# Patient Record
Sex: MALE | Race: WHITE | NOT HISPANIC OR LATINO | Employment: FULL TIME | RURAL
[De-identification: names, ages, dates, MRNs, and addresses within clinical notes are randomized per-mention and may not be internally consistent; named-entity substitution may affect disease eponyms.]

---

## 2020-02-24 ENCOUNTER — OFFICE VISIT (OUTPATIENT)
Dept: FAMILY MEDICINE CLINIC | Facility: CLINIC | Age: 35
End: 2020-02-24

## 2020-02-24 VITALS
WEIGHT: 222.6 LBS | HEIGHT: 72 IN | OXYGEN SATURATION: 95 % | DIASTOLIC BLOOD PRESSURE: 80 MMHG | TEMPERATURE: 98 F | HEART RATE: 96 BPM | BODY MASS INDEX: 30.15 KG/M2 | RESPIRATION RATE: 20 BRPM | SYSTOLIC BLOOD PRESSURE: 120 MMHG

## 2020-02-24 DIAGNOSIS — Z76.89 ENCOUNTER TO ESTABLISH CARE: Primary | ICD-10-CM

## 2020-02-24 DIAGNOSIS — E08.21 DIABETES DUE TO UNDERLYING CONDITION W DIABETIC NEPHROPATHY (HCC): Primary | ICD-10-CM

## 2020-02-24 DIAGNOSIS — R31.9 HEMATURIA, UNSPECIFIED TYPE: ICD-10-CM

## 2020-02-24 DIAGNOSIS — R73.9 HYPERGLYCEMIA: ICD-10-CM

## 2020-02-24 DIAGNOSIS — R81 GLYCOSURIA: ICD-10-CM

## 2020-02-24 DIAGNOSIS — G25.81 RESTLESS LEG SYNDROME: ICD-10-CM

## 2020-02-24 LAB
BILIRUB BLD-MCNC: NEGATIVE MG/DL
CLARITY, POC: CLEAR
COLOR UR: ABNORMAL
GLUCOSE UR STRIP-MCNC: ABNORMAL MG/DL
KETONES UR QL: NEGATIVE
LEUKOCYTE EST, POC: NEGATIVE
NITRITE UR-MCNC: NEGATIVE MG/ML
PH UR: 5.5 [PH] (ref 5–8)
PROT UR STRIP-MCNC: NEGATIVE MG/DL
RBC # UR STRIP: ABNORMAL /UL
SP GR UR: 1.01 (ref 1–1.03)
UROBILINOGEN UR QL: NORMAL

## 2020-02-24 PROCEDURE — 81002 URINALYSIS NONAUTO W/O SCOPE: CPT | Performed by: NURSE PRACTITIONER

## 2020-02-24 PROCEDURE — 99203 OFFICE O/P NEW LOW 30 MIN: CPT | Performed by: NURSE PRACTITIONER

## 2020-02-24 PROCEDURE — 87086 URINE CULTURE/COLONY COUNT: CPT | Performed by: NURSE PRACTITIONER

## 2020-02-24 RX ORDER — ATORVASTATIN CALCIUM 10 MG/1
10 TABLET, FILM COATED ORAL DAILY
Qty: 30 TABLET | Refills: 0 | Status: SHIPPED | OUTPATIENT
Start: 2020-02-24 | End: 2020-03-27

## 2020-02-24 RX ORDER — ROPINIROLE 0.5 MG/1
0.5 TABLET, FILM COATED ORAL NIGHTLY
Qty: 14 TABLET | Refills: 0 | Status: SHIPPED | OUTPATIENT
Start: 2020-02-24 | End: 2020-03-09

## 2020-02-24 NOTE — PROGRESS NOTES
"Chief Complaint   Patient presents with   • Establish Care     New Pt       Subjective:  Cal Badillo is a 34 y.o. male who presents to [facility]. Pt reports hx of DM, RLS and diabetic neuropathy. Reports he has not been on any medications x 1-1/2 years. Reports has been to ER on several occasions for BS >1300. Reports BS stays around 400s, with last known A1c = 20. Reports dizziness, polyuria, polyphagia and weight loss of 40 pounds in 2 mos. Currently no past medical records to review.      The following portions of the patient's history were reviewed and updated as appropriate: allergies, current medications, past family history, past medical history, past social history, past surgical history and problem list.    Here to [facility]. Has no medical records to review. Reports last PCP was Queenie Real. Hx of DM, diabetic neuropathy, RLS and \"mild stroke d/t DM\". Reports no residual effects from CVA. Has glucometer to check BS at home and reports BS regularly 400s. Has been seen in ER for BS >1300. Associated sx reported: dizziness, polyuria, polyphagia, polydipsia and weight loss. Has not taken any medication in past 1-1/2 years.       Past Medical History:   Diagnosis Date   • CVA (cerebral vascular accident) (CMS/[facility])    • Diabetes mellitus (CMS/[facility])          Current Outpatient Medications:   •  atorvastatin (LIPITOR) 10 MG tablet, Take 1 tablet by mouth Daily., Disp: 30 tablet, Rfl: 0  •  metFORMIN (GLUCOPHAGE) 500 MG tablet, Take 1 tablet by mouth 2 (Two) Times a Day With Meals., Disp: 30 tablet, Rfl: 0  •  rOPINIRole (REQUIP) 0.5 MG tablet, Take 1 tablet by mouth Every Night for 14 days. Take 1 hour before bedtime. Take 1/2 tab x 3 days then increase to 1 tab., Disp: 14 tablet, Rfl: 0    Review of Systems    Review of Systems   Constitutional: Positive for unexpected weight change.   Respiratory: Negative for chest tightness.    Cardiovascular: Negative for chest pain and leg swelling. " "  Gastrointestinal: Positive for abdominal pain. Negative for constipation, diarrhea and vomiting.   Endocrine: Positive for polydipsia, polyphagia and polyuria.   Genitourinary: Negative for dysuria.   Musculoskeletal: Positive for arthralgias.   Neurological: Positive for dizziness. Negative for syncope.       Objective  Vitals:    02/24/20 0926   BP: 120/80   BP Location: Left arm   Patient Position: Sitting   Cuff Size: Adult   Pulse: 96   Resp: 20   Temp: 98 °F (36.7 °C)   TempSrc: Oral   SpO2: 95%   Weight: 101 kg (222 lb 9.6 oz)   Height: 182.9 cm (72\")   PainSc: 0-No pain       Physical Exam   Constitutional: He is oriented to person, place, and time. He appears well-developed and well-nourished.   HENT:   Head: Normocephalic and atraumatic.   Eyes: Pupils are equal, round, and reactive to light. Conjunctivae are normal.   Neck: Normal range of motion. Neck supple.   Cardiovascular: Normal rate, regular rhythm and normal heart sounds.   Pulmonary/Chest: Effort normal and breath sounds normal.   Musculoskeletal: He exhibits tenderness (bilat shoulders).   Lymphadenopathy:     He has no cervical adenopathy.   Neurological: He is alert and oriented to person, place, and time.   Skin: Skin is warm and dry.   Psychiatric: He has a normal mood and affect. His behavior is normal.   Nursing note and vitals reviewed.        Cal was seen today for establish care.    Diagnoses and all orders for this visit:    Encounter to establish care    Hyperglycemia  -     Comprehensive Metabolic Panel; Future  -     Hemoglobin A1c; Future  -     metFORMIN (GLUCOPHAGE) 500 MG tablet; Take 1 tablet by mouth 2 (Two) Times a Day With Meals.  -     atorvastatin (LIPITOR) 10 MG tablet; Take 1 tablet by mouth Daily.  -     Ambulatory Referral to Endocrinology  -     MicroAlbumin, Urine, Random - Urine, Clean Catch; Future    Glycosuria  -     Comprehensive Metabolic Panel; Future  -     Hemoglobin A1c; Future  -     Ambulatory " Referral to Endocrinology  -     MicroAlbumin, Urine, Random - Urine, Clean Catch; Future    Hematuria, unspecified type  -     Urine Culture - Urine, Urine, Clean Catch    Restless leg syndrome  -     rOPINIRole (REQUIP) 0.5 MG tablet; Take 1 tablet by mouth Every Night for 14 days. Take 1 hour before bedtime. Take 1/2 tab x 3 days then increase to 1 tab.    1. Est care visit with no past medical records to review. All PMH based on subjective information. Pt to sign medical release for PCP and ER records.  2.  with 3+ glucose in urine today. Pt reports BS normally 400s.  3. Will send urine for cx d/t blood present and glucose.   4. CMP and A1c ordered today.  5. Will restart metformin, atorvastatin and ropinirole today.  6. Advised monitoring BS daily and to ER for persistent BS >400-500. Pt v/u.  7. RTC 1 week or PRN  8. Will intiate endocrinology referral.      This document has been electronically signed by KAYLAN Kendall on February 24, 2020 5:33 PM

## 2020-02-25 DIAGNOSIS — E11.9 TYPE 2 DIABETES MELLITUS WITHOUT COMPLICATION, UNSPECIFIED WHETHER LONG TERM INSULIN USE (HCC): Primary | ICD-10-CM

## 2020-02-25 RX ORDER — INSULIN GLARGINE 100 [IU]/ML
20 INJECTION, SOLUTION SUBCUTANEOUS NIGHTLY
Qty: 3 ML | Refills: 4 | Status: SHIPPED | OUTPATIENT
Start: 2020-02-25 | End: 2020-05-04 | Stop reason: SDUPTHER

## 2020-02-26 ENCOUNTER — LAB (OUTPATIENT)
Dept: LAB | Facility: HOSPITAL | Age: 35
End: 2020-02-26

## 2020-02-26 ENCOUNTER — TELEPHONE (OUTPATIENT)
Dept: FAMILY MEDICINE CLINIC | Facility: CLINIC | Age: 35
End: 2020-02-26

## 2020-02-26 DIAGNOSIS — R73.9 HYPERGLYCEMIA: ICD-10-CM

## 2020-02-26 DIAGNOSIS — E11.9 TYPE 2 DIABETES MELLITUS WITHOUT COMPLICATION, UNSPECIFIED WHETHER LONG TERM INSULIN USE (HCC): Primary | ICD-10-CM

## 2020-02-26 DIAGNOSIS — R81 GLYCOSURIA: ICD-10-CM

## 2020-02-26 LAB
ALBUMIN SERPL-MCNC: 4.1 G/DL (ref 3.5–5.2)
ALBUMIN UR-MCNC: <1.2 MG/DL
ALBUMIN/GLOB SERPL: 1.4 G/DL
ALP SERPL-CCNC: 88 U/L (ref 39–117)
ALT SERPL W P-5'-P-CCNC: 23 U/L (ref 1–41)
ANION GAP SERPL CALCULATED.3IONS-SCNC: 14.4 MMOL/L (ref 5–15)
AST SERPL-CCNC: 15 U/L (ref 1–40)
BACTERIA SPEC AEROBE CULT: NO GROWTH
BILIRUB SERPL-MCNC: 0.3 MG/DL (ref 0.2–1.2)
BUN BLD-MCNC: 9 MG/DL (ref 6–20)
BUN/CREAT SERPL: 9.8 (ref 7–25)
CALCIUM SPEC-SCNC: 9.5 MG/DL (ref 8.6–10.5)
CHLORIDE SERPL-SCNC: 90 MMOL/L (ref 98–107)
CO2 SERPL-SCNC: 25.6 MMOL/L (ref 22–29)
CREAT BLD-MCNC: 0.92 MG/DL (ref 0.76–1.27)
GFR SERPL CREATININE-BSD FRML MDRD: 93 ML/MIN/1.73
GLOBULIN UR ELPH-MCNC: 2.9 GM/DL
GLUCOSE BLD-MCNC: 512 MG/DL (ref 65–99)
HBA1C MFR BLD: 12.8 % (ref 4.8–5.6)
POTASSIUM BLD-SCNC: 4.2 MMOL/L (ref 3.5–5.2)
PROT SERPL-MCNC: 7 G/DL (ref 6–8.5)
SODIUM BLD-SCNC: 130 MMOL/L (ref 136–145)

## 2020-02-26 PROCEDURE — 82043 UR ALBUMIN QUANTITATIVE: CPT

## 2020-02-26 PROCEDURE — 80053 COMPREHEN METABOLIC PANEL: CPT

## 2020-02-26 PROCEDURE — 83036 HEMOGLOBIN GLYCOSYLATED A1C: CPT

## 2020-02-26 RX ORDER — PEN NEEDLE, DIABETIC 30 GX3/16"
1 NEEDLE, DISPOSABLE MISCELLANEOUS DAILY
Qty: 30 EACH | Refills: 11 | Status: SHIPPED | OUTPATIENT
Start: 2020-02-26 | End: 2021-02-09

## 2020-02-26 NOTE — TELEPHONE ENCOUNTER
Called Karen to verify scripts were received.  They were received, not filled because not due till the 2nd of march

## 2020-02-27 ENCOUNTER — TELEPHONE (OUTPATIENT)
Dept: FAMILY MEDICINE CLINIC | Facility: CLINIC | Age: 35
End: 2020-02-27

## 2020-02-27 DIAGNOSIS — Z79.4 TYPE 2 DIABETES MELLITUS WITH OTHER SPECIFIED COMPLICATION, WITH LONG-TERM CURRENT USE OF INSULIN (HCC): Primary | ICD-10-CM

## 2020-02-27 DIAGNOSIS — E11.69 TYPE 2 DIABETES MELLITUS WITH OTHER SPECIFIED COMPLICATION, WITH LONG-TERM CURRENT USE OF INSULIN (HCC): Primary | ICD-10-CM

## 2020-02-27 RX ORDER — BLOOD-GLUCOSE METER
1 KIT MISCELLANEOUS TAKE AS DIRECTED
Qty: 1 EACH | Refills: 0 | Status: SHIPPED | OUTPATIENT
Start: 2020-02-27 | End: 2021-02-09

## 2020-02-27 NOTE — TELEPHONE ENCOUNTER
Lab at [facility] alerted me about pt's labwork glucose >500.      Spoke with KAYLAN Goodwin and pt started on insulin and  was advised to  Start insulin as soon as possible.  Also verified with pharmacy about insulin prescription.          This document has been electronically signed by Tim Ray MD on February 26, 2020 7:58 PM

## 2020-02-28 ENCOUNTER — TELEPHONE (OUTPATIENT)
Dept: FAMILY MEDICINE CLINIC | Facility: CLINIC | Age: 35
End: 2020-02-28

## 2020-02-28 NOTE — TELEPHONE ENCOUNTER
"I called [facility] to check why this was happening, Jolanta stated that \"the insurance told them that he was locked to specific provider\" I called previous PCP office asked about this and she stated yes he would have to get his PCP on his insurance changed, I called pt to inform of this information and he stated he has taken care of it and was at [facility] now. I also had previous PCP office send over pt medical records. "

## 2020-02-28 NOTE — TELEPHONE ENCOUNTER
PT CALLED AND STATED THAT ALL OF HIS DIABETES SUPPLIES ARE BEING DENIED BY HIS INSURANCE. PT STATED HIS BLOOD SUGAR IS . PLEASE ADVISE.

## 2020-03-02 ENCOUNTER — TELEPHONE (OUTPATIENT)
Dept: FAMILY MEDICINE CLINIC | Facility: CLINIC | Age: 35
End: 2020-03-02

## 2020-03-02 ENCOUNTER — OFFICE VISIT (OUTPATIENT)
Dept: FAMILY MEDICINE CLINIC | Facility: CLINIC | Age: 35
End: 2020-03-02

## 2020-03-02 VITALS
HEART RATE: 73 BPM | OXYGEN SATURATION: 97 % | BODY MASS INDEX: 29.47 KG/M2 | WEIGHT: 217.6 LBS | DIASTOLIC BLOOD PRESSURE: 80 MMHG | RESPIRATION RATE: 20 BRPM | HEIGHT: 72 IN | SYSTOLIC BLOOD PRESSURE: 122 MMHG | TEMPERATURE: 98.1 F

## 2020-03-02 DIAGNOSIS — Z79.4 TYPE 2 DIABETES MELLITUS WITHOUT COMPLICATION, WITH LONG-TERM CURRENT USE OF INSULIN (HCC): Primary | ICD-10-CM

## 2020-03-02 DIAGNOSIS — E11.9 TYPE 2 DIABETES MELLITUS WITHOUT COMPLICATION, WITH LONG-TERM CURRENT USE OF INSULIN (HCC): Primary | ICD-10-CM

## 2020-03-02 PROCEDURE — 99213 OFFICE O/P EST LOW 20 MIN: CPT | Performed by: NURSE PRACTITIONER

## 2020-03-02 RX ORDER — LANCETS
EACH MISCELLANEOUS
Qty: 200 EACH | Refills: 5 | Status: SHIPPED | OUTPATIENT
Start: 2020-03-02 | End: 2020-05-04 | Stop reason: SDUPTHER

## 2020-03-02 RX ORDER — BLOOD PRESSURE TEST KIT
KIT MISCELLANEOUS
Qty: 120 EACH | Refills: 3 | Status: SHIPPED | OUTPATIENT
Start: 2020-03-02 | End: 2020-05-04 | Stop reason: SDUPTHER

## 2020-03-02 RX ORDER — BLOOD-GLUCOSE METER
EACH MISCELLANEOUS
COMMUNITY
Start: 2020-02-28 | End: 2020-12-30

## 2020-03-02 NOTE — TELEPHONE ENCOUNTER
Pt called at 10:21 am stated that his blood sugar per finger stick is 525. Pt wanted to know how many units of his fast acting insulin Novolin R insulin he could take .I informed Pt that his appointment is at 10:45 and to come to the office now so that the current reading can be addressed.

## 2020-03-05 PROBLEM — N20.0 CALCULUS OF KIDNEY: Status: ACTIVE | Noted: 2020-03-05

## 2020-03-05 PROBLEM — Z79.4 TYPE 2 DIABETES MELLITUS WITHOUT COMPLICATION, WITH LONG-TERM CURRENT USE OF INSULIN (HCC): Status: ACTIVE | Noted: 2020-03-05

## 2020-03-05 PROBLEM — E78.2 MIXED HYPERLIPIDEMIA: Status: ACTIVE | Noted: 2020-03-05

## 2020-03-05 PROBLEM — I10 ESSENTIAL HYPERTENSION: Status: ACTIVE | Noted: 2020-03-05

## 2020-03-05 PROBLEM — E11.9 TYPE 2 DIABETES MELLITUS WITHOUT COMPLICATION, WITH LONG-TERM CURRENT USE OF INSULIN (HCC): Status: ACTIVE | Noted: 2020-03-05

## 2020-03-05 PROBLEM — J44.9 COPD (CHRONIC OBSTRUCTIVE PULMONARY DISEASE) (HCC): Status: ACTIVE | Noted: 2020-03-05

## 2020-03-05 NOTE — PROGRESS NOTES
"Chief Complaint   Patient presents with   • Follow-up     1 weeks   • Diabetes       Subjective:  Cal Badillo is a 34 y.o. male who presents for f/u on high blood sugars. Reports going to [facility] ER as directed. Upon arrival to ER pt reports BS was 525. Was given insulin in ER and was DC with BS of 440 and told to f/u with PCP. Pt reports now has all diabetic supplies including insulin, glucometer, strips that were originally ordered but d/t ins \"lock in program\" was unable to get insulin and supplies filled. Pt requesting RX for alcohol swabs, insulin needles and lancets.       The following portions of the patient's history were reviewed and updated as appropriate: allergies, current medications, past family history, past medical history, past social history, past surgical history and problem list.    Diabetes   He presents for his follow-up diabetic visit. He has type 2 diabetes mellitus. His disease course has been worsening. Pertinent negatives for hypoglycemia include no dizziness or headaches. Associated symptoms include fatigue. Pertinent negatives for diabetes include no chest pain, no polydipsia, no polyphagia, no polyuria, no visual change, no weakness and no weight loss. Pertinent negatives for hypoglycemia complications include no blackouts, no hospitalization, no nocturnal hypoglycemia, no required assistance and no required glucagon injection. Symptoms are worsening. Risk factors for coronary artery disease include male sex and diabetes mellitus. Current diabetic treatment includes oral agent (monotherapy) and insulin injections. He is compliant with treatment some of the time. His weight is stable. When asked about meal planning, he reported none. He has not had a previous visit with a dietitian (referral submitted). He rarely participates in exercise. There is no change in his home blood glucose trend. An ACE inhibitor/angiotensin II receptor blocker is being taken. He does not see a podiatrist.Eye " exam is not current.        Past Medical History:   Diagnosis Date   • CVA (cerebral vascular accident) (CMS/[facility])    • Diabetes mellitus (CMS/[facility])          Current Outpatient Medications:   •  atorvastatin (LIPITOR) 10 MG tablet, Take 1 tablet by mouth Daily., Disp: 30 tablet, Rfl: 0  •  Blood Glucose Monitoring Suppl (ONE TOUCH ULTRA 2) w/Device kit, , Disp: , Rfl:   •  glucose blood (ONE TOUCH ULTRA TEST) test strip, Check blood sugar 2 times daily dx e11.9, Disp: 100 each, Rfl: 12  •  glucose monitor monitoring kit, 1 each Take As Directed. Check BS 1 hr before meals, 2 hrs after meals and at bedtime, Disp: 1 each, Rfl: 0  •  Insulin Glargine (BASAGLAR KWIKPEN) 100 UNIT/ML injection pen, Inject 20 Units under the skin into the appropriate area as directed Every Night., Disp: 3 mL, Rfl: 4  •  Insulin Pen Needle (PEN NEEDLES) 31G X 8 MM misc, 1 each Daily., Disp: 30 each, Rfl: 11  •  rOPINIRole (REQUIP) 0.5 MG tablet, Take 1 tablet by mouth Every Night for 14 days. Take 1 hour before bedtime. Take 1/2 tab x 3 days then increase to 1 tab., Disp: 14 tablet, Rfl: 0  •  Alcohol Swabs pads, Use swab as needed to clean insulin vial and to clean skin before injection or finger stick., Disp: 120 each, Rfl: 3  •  insulin regular (HUMULIN R) 100 UNIT/ML injection, Inject 10 Units under the skin into the appropriate area as directed 3 (Three) Times a Day Before Meals., Disp: 10 mL, Rfl: 11  •  Insulin Syringes, Disposable, U-100 0.3 ML misc, 1 syringe As Needed (use with each Humulin R dose administration)., Disp: 100 each, Rfl: 5  •  Lancets (ONETOUCH ULTRASOFT) lancets, Use to obtain blood sample to test blood sugar at least 3 times daily, Disp: 200 each, Rfl: 5  •  metFORMIN (GLUCOPHAGE) 1000 MG tablet, Take 1 tablet by mouth 2 (Two) Times a Day With Meals., Disp: 30 tablet, Rfl: 5    Review of Systems    Review of Systems   Constitutional: Positive for fatigue. Negative for weight loss.   Cardiovascular: Negative for  "chest pain, palpitations and leg swelling.   Endocrine: Negative for polydipsia, polyphagia and polyuria.   Neurological: Negative for dizziness, weakness, light-headedness, numbness and headaches.   Psychiatric/Behavioral: Negative for sleep disturbance.       Objective  Vitals:    03/02/20 1110   BP: 122/80   BP Location: Right arm   Patient Position: Sitting   Cuff Size: Adult   Pulse: 73   Resp: 20   Temp: 98.1 °F (36.7 °C)   TempSrc: Oral   SpO2: 97%   Weight: 98.7 kg (217 lb 9.6 oz)   Height: 182.9 cm (72\")   PainSc: 0-No pain       Physical Exam   Constitutional: He is oriented to person, place, and time. He appears well-developed and well-nourished.   HENT:   Head: Normocephalic and atraumatic.   Eyes: Pupils are equal, round, and reactive to light. Conjunctivae are normal.   Neck: Normal range of motion. Neck supple.   Cardiovascular: Normal rate, regular rhythm and normal heart sounds.   Pulmonary/Chest: Effort normal and breath sounds normal.   Musculoskeletal: Normal range of motion.   Neurological: He is alert and oriented to person, place, and time.   Psychiatric: He has a normal mood and affect. His behavior is normal.   Nursing note and vitals reviewed.        Cal was seen today for follow-up and diabetes.    Diagnoses and all orders for this visit:    Type 2 diabetes mellitus with other specified complication, with long-term current use of insulin (CMS/[facility])  -     Ambulatory Referral to Nutrition Services  -     Alcohol Swabs pads; Use swab as needed to clean insulin vial and to clean skin before injection or finger stick.  -     Insulin Syringes, Disposable, U-100 0.3 ML misc; 1 syringe As Needed (use with each Humulin R dose administration).  -     insulin regular (HUMULIN R) 100 UNIT/ML injection; Inject 10 Units under the skin into the appropriate area as directed 3 (Three) Times a Day Before Meals.  -     metFORMIN (GLUCOPHAGE) 1000 MG tablet; Take 1 tablet by mouth 2 (Two) Times a Day With " Meals.  -     Lancets (ONETOUCH ULTRASOFT) lancets; Use to obtain blood sample to test blood sugar at least 3 times daily    1. Metformin increased to 1000 mg BID - Discussed possible GI SE. Pt v/u.  2. Discussed checking BS one hour before meals and 2 hrs after meals and at HS and bring log to next appt.  3. Discussed importance of compliance with insulin, metformin and diet. Pt v/u.  4. Referral to diabetic educator/nutritionist submitted today.  5. RTC 2 weeks for f/u or PRN pending BS control will consider adding SGLT2 or GLP-1.       This document has been electronically signed by KAYLAN Kendall on March 5, 2020 11:13 AM

## 2020-03-27 DIAGNOSIS — R73.9 HYPERGLYCEMIA: ICD-10-CM

## 2020-03-27 RX ORDER — ATORVASTATIN CALCIUM 10 MG/1
TABLET, FILM COATED ORAL
Qty: 30 TABLET | Refills: 0 | Status: SHIPPED | OUTPATIENT
Start: 2020-03-27 | End: 2020-05-04 | Stop reason: SDUPTHER

## 2020-03-30 RX ORDER — SYRING-NEEDL,DISP,INSUL,0.3 ML 31 GX5/16"
SYRINGE, EMPTY DISPOSABLE MISCELLANEOUS
COMMUNITY
Start: 2020-03-03 | End: 2021-02-09

## 2020-05-04 ENCOUNTER — OFFICE VISIT (OUTPATIENT)
Dept: FAMILY MEDICINE CLINIC | Facility: CLINIC | Age: 35
End: 2020-05-04

## 2020-05-04 ENCOUNTER — TELEPHONE (OUTPATIENT)
Dept: FAMILY MEDICINE CLINIC | Facility: CLINIC | Age: 35
End: 2020-05-04

## 2020-05-04 VITALS
BODY MASS INDEX: 30.07 KG/M2 | DIASTOLIC BLOOD PRESSURE: 91 MMHG | OXYGEN SATURATION: 96 % | WEIGHT: 222 LBS | HEART RATE: 93 BPM | RESPIRATION RATE: 18 BRPM | TEMPERATURE: 97.5 F | SYSTOLIC BLOOD PRESSURE: 134 MMHG | HEIGHT: 72 IN

## 2020-05-04 DIAGNOSIS — M25.521 RIGHT ELBOW PAIN: ICD-10-CM

## 2020-05-04 DIAGNOSIS — G47.00 INSOMNIA, UNSPECIFIED TYPE: ICD-10-CM

## 2020-05-04 DIAGNOSIS — G25.81 RESTLESS LEG SYNDROME: ICD-10-CM

## 2020-05-04 DIAGNOSIS — E11.9 TYPE 2 DIABETES MELLITUS WITHOUT COMPLICATION, WITH LONG-TERM CURRENT USE OF INSULIN (HCC): Primary | ICD-10-CM

## 2020-05-04 DIAGNOSIS — G89.29 CHRONIC LEFT SHOULDER PAIN: ICD-10-CM

## 2020-05-04 DIAGNOSIS — E11.40 DIABETIC NEUROPATHY, PAINFUL (HCC): ICD-10-CM

## 2020-05-04 DIAGNOSIS — M25.512 CHRONIC LEFT SHOULDER PAIN: ICD-10-CM

## 2020-05-04 DIAGNOSIS — Z79.4 TYPE 2 DIABETES MELLITUS WITHOUT COMPLICATION, WITH LONG-TERM CURRENT USE OF INSULIN (HCC): Primary | ICD-10-CM

## 2020-05-04 PROBLEM — J44.9 COPD (CHRONIC OBSTRUCTIVE PULMONARY DISEASE) (HCC): Status: RESOLVED | Noted: 2020-03-05 | Resolved: 2020-05-04

## 2020-05-04 PROBLEM — G47.39 OTHER SLEEP APNEA: Status: ACTIVE | Noted: 2020-05-04

## 2020-05-04 PROCEDURE — 99214 OFFICE O/P EST MOD 30 MIN: CPT | Performed by: NURSE PRACTITIONER

## 2020-05-04 RX ORDER — INSULIN GLARGINE 100 [IU]/ML
20 INJECTION, SOLUTION SUBCUTANEOUS NIGHTLY
Qty: 3 ML | Refills: 5 | Status: SHIPPED | OUTPATIENT
Start: 2020-05-04 | End: 2021-02-09

## 2020-05-04 RX ORDER — ATORVASTATIN CALCIUM 10 MG/1
10 TABLET, FILM COATED ORAL DAILY
Qty: 30 TABLET | Refills: 5 | Status: SHIPPED | OUTPATIENT
Start: 2020-05-04 | End: 2021-02-09

## 2020-05-04 RX ORDER — LANCETS
EACH MISCELLANEOUS
Qty: 200 EACH | Refills: 5 | Status: SHIPPED | OUTPATIENT
Start: 2020-05-04 | End: 2021-02-09

## 2020-05-04 RX ORDER — BLOOD PRESSURE TEST KIT
KIT MISCELLANEOUS
Qty: 120 EACH | Refills: 5 | Status: SHIPPED | OUTPATIENT
Start: 2020-05-04

## 2020-05-04 RX ORDER — SYRING-NEEDL,DISP,INSUL,0.3 ML 31 GX5/16"
SYRINGE, EMPTY DISPOSABLE MISCELLANEOUS
Status: CANCELLED | OUTPATIENT
Start: 2020-05-04

## 2020-05-04 RX ORDER — GABAPENTIN 300 MG/1
300 CAPSULE ORAL 3 TIMES DAILY
Qty: 90 CAPSULE | Refills: 0 | Status: SHIPPED | OUTPATIENT
Start: 2020-05-04 | End: 2020-05-18

## 2020-05-04 RX ORDER — TRAZODONE HYDROCHLORIDE 50 MG/1
50 TABLET ORAL NIGHTLY
Qty: 30 TABLET | Refills: 0 | Status: SHIPPED | OUTPATIENT
Start: 2020-05-04 | End: 2020-06-01

## 2020-05-04 RX ORDER — ROPINIROLE 0.5 MG/1
1 TABLET, FILM COATED ORAL NIGHTLY
Qty: 30 TABLET | Refills: 5 | Status: SHIPPED | OUTPATIENT
Start: 2020-05-04 | End: 2020-08-10

## 2020-05-04 RX ORDER — LISINOPRIL 10 MG/1
10 TABLET ORAL DAILY
Qty: 30 TABLET | Refills: 5 | Status: SHIPPED | OUTPATIENT
Start: 2020-05-04 | End: 2020-12-23 | Stop reason: SDUPTHER

## 2020-05-04 RX ORDER — GABAPENTIN 800 MG/1
800 TABLET ORAL 3 TIMES DAILY
Status: CANCELLED | OUTPATIENT
Start: 2020-05-04

## 2020-05-04 NOTE — PROGRESS NOTES
Chief Complaint   Patient presents with   • Diabetes     2 week recheck       Subjective:  Cal Badillo is a 34 y.o. male who presents for f/u for diabetes. Pt reports BS are still running 400-500s and has been to ER 3 times since his last appt. Reports he has not been to endocrinologist yet due to rescheduling because of COVID. Reports taking all medications a prescribed and limiting carb intake. Also c/o left shoulder pain that has been present for > 1 year and right elbow pain that has been present approx 6 mos. Denies known injury. Reports having xray of left shoulder over one year ago at his previous PCP and reports results were normal. Does report radiating pain with shoulder and elbow. Pain level 9/10. Requesting an increase in requip for RLS, states current dosing helps minimally, reports taking 1mg at HS in past. Also requesting refill of gabapentin for his neuropathy. Reports was seeing pain management for his gabapentin from but no longer goes.      The following portions of the patient's history were reviewed and updated as appropriate: allergies, current medications, past family history, past medical history, past social history, past surgical history and problem list.    Diabetes   He presents for his follow-up diabetic visit. He has type 1 diabetes mellitus. His disease course has been worsening. Pertinent negatives for hypoglycemia include no confusion, dizziness, sleepiness, sweats or tremors. Associated symptoms include foot paresthesias. Pertinent negatives for diabetes include no blurred vision, no chest pain, no fatigue and no foot ulcerations. Hypoglycemia complications include nocturnal hypoglycemia. Pertinent negatives for hypoglycemia complications include no blackouts, no hospitalization, no required assistance and no required glucagon injection. Symptoms are worsening. Diabetic complications include peripheral neuropathy. Risk factors for coronary artery disease include diabetes mellitus,  obesity, male sex, hypertension and stress. Current diabetic treatment includes oral agent (monotherapy), insulin injections and diet. He is compliant with treatment some of the time. His weight is stable. There is no change in his home blood glucose trend. His overall blood glucose range is >200 mg/dl. An ACE inhibitor/angiotensin II receptor blocker is being taken. He does not see a podiatrist.Eye exam is not current.   Arm Pain    There was no injury mechanism. The pain is present in the right elbow and left shoulder. The quality of the pain is described as aching. Radiates to: left shoulder radiates to left elbow; right elbow radiates down to right hand. The pain is at a severity of 9/10. The pain is severe. The pain has been intermittent since the incident. Pertinent negatives include no chest pain, muscle weakness, numbness or tingling. The symptoms are aggravated by movement, lifting and palpation. He has tried rest for the symptoms. The treatment provided no relief.   Leg Pain    There was no injury mechanism (r/t neuropathy). The pain is present in the left foot and right foot. The pain is at a severity of 9/10. The pain has been constant since onset. Pertinent negatives include no inability to bear weight, loss of motion, loss of sensation, muscle weakness, numbness or tingling. The symptoms are aggravated by weight bearing. He has tried rest for the symptoms. The treatment provided no relief.   Insomnia   This is a chronic problem. The current episode started more than 1 year ago. The problem occurs daily. The problem has been unchanged. Associated symptoms include arthralgias (left shoulder, right elbow, bilat lower extremities). Pertinent negatives include no chest pain, fatigue or numbness.        Past Medical History:   Diagnosis Date   • Diabetes mellitus (CMS/HCC)    • Diabetic polyneuropathy (CMS/HCC)    • Lumbar radiculopathy, chronic 2017         Current Outpatient Medications:   •  Alcohol Swabs  "pads, Use swab as needed to clean insulin vial and to clean skin before injection or finger stick., Disp: 120 each, Rfl: 5  •  atorvastatin (LIPITOR) 10 MG tablet, Take 1 tablet by mouth Daily., Disp: 30 tablet, Rfl: 5  •  Blood Glucose Monitoring Suppl (ONE TOUCH ULTRA 2) w/Device kit, , Disp: , Rfl:   •  gabapentin (NEURONTIN) 300 MG capsule, Take 1 capsule by mouth 3 (Three) Times a Day., Disp: 90 capsule, Rfl: 0  •  glucose blood (ONE TOUCH ULTRA TEST) test strip, Check blood sugar 2 times daily dx e11.9, Disp: 100 each, Rfl: 12  •  glucose monitor monitoring kit, 1 each Take As Directed. Check BS 1 hr before meals, 2 hrs after meals and at bedtime, Disp: 1 each, Rfl: 0  •  Insulin Glargine (BASAGLAR KWIKPEN) 100 UNIT/ML injection pen, Inject 20 Units under the skin into the appropriate area as directed Every Night., Disp: 3 mL, Rfl: 5  •  Insulin Pen Needle (PEN NEEDLES) 31G X 8 MM misc, 1 each Daily., Disp: 30 each, Rfl: 11  •  insulin regular (HumuLIN R) 100 UNIT/ML injection, Inject 10 Units under the skin into the appropriate area as directed 3 (Three) Times a Day Before Meals., Disp: 10 mL, Rfl: 5  •  Insulin Syringes, Disposable, U-100 0.3 ML misc, 1 syringe As Needed (use with each Humulin R dose administration)., Disp: 100 each, Rfl: 5  •  Lancets (ONETOUCH ULTRASOFT) lancets, Use to obtain blood sample to test blood sugar at least 3 times daily, Disp: 200 each, Rfl: 5  •  lisinopril (PRINIVIL,ZESTRIL) 10 MG tablet, Take 1 tablet by mouth Daily., Disp: 30 tablet, Rfl: 5  •  metFORMIN (Glucophage) 1000 MG tablet, Take 1 tablet by mouth 2 (Two) Times a Day With Meals., Disp: 30 tablet, Rfl: 5  •  rOPINIRole (Requip) 0.5 MG tablet, Take 2 tablets by mouth Every Night. Take 1 hour before bedtime., Disp: 30 tablet, Rfl: 5  •  traZODone (DESYREL) 50 MG tablet, Take 1 tablet by mouth Every Night., Disp: 30 tablet, Rfl: 0  •  TRUEPLUS INSULIN SYRINGE 31G X 5/16\" 0.3 ML misc, , Disp: , Rfl:     Review of " "Systems    Review of Systems   Constitutional: Negative for fatigue and unexpected weight change.   Eyes: Negative for blurred vision.   Respiratory: Negative for chest tightness and shortness of breath.    Cardiovascular: Negative for chest pain.   Musculoskeletal: Positive for arthralgias (left shoulder, right elbow, bilat lower extremities) and back pain.   Neurological: Negative for dizziness, tingling, tremors and numbness.   Psychiatric/Behavioral: Positive for sleep disturbance. Negative for confusion. The patient has insomnia.        Objective  Vitals:    05/04/20 1349   BP: 134/91   BP Location: Right arm   Patient Position: Sitting   Cuff Size: Adult   Pulse: 93   Resp: 18   Temp: 97.5 °F (36.4 °C)   TempSrc: Tympanic   SpO2: 96%   Weight: 101 kg (222 lb)   Height: 182.9 cm (72\")   PainSc:   9   PainLoc: Generalized       Physical Exam   Constitutional: He is oriented to person, place, and time. He appears well-developed and well-nourished. No distress.   HENT:   Head: Normocephalic and atraumatic.   Eyes: Pupils are equal, round, and reactive to light. Conjunctivae are normal.   Neck: Normal range of motion. Neck supple.   Cardiovascular: Normal rate, regular rhythm and normal heart sounds.   Pulmonary/Chest: Effort normal and breath sounds normal. No respiratory distress.   Musculoskeletal: He exhibits tenderness (left shoulder, right elbow).        Left shoulder: He exhibits decreased range of motion, tenderness and pain.        Right elbow: Tenderness found. Lateral epicondyle tenderness noted.        Arms:  Neurological: He is alert and oriented to person, place, and time.   Skin: Skin is warm and dry.   Psychiatric: He has a normal mood and affect. His behavior is normal.   Nursing note and vitals reviewed.        Cal was seen today for diabetes.    Diagnoses and all orders for this visit:    Type 2 diabetes mellitus without complication, with long-term current use of insulin (CMS/[facility])  -     " atorvastatin (LIPITOR) 10 MG tablet; Take 1 tablet by mouth Daily.  -     Lancets (ONETOUCH ULTRASOFT) lancets; Use to obtain blood sample to test blood sugar at least 3 times daily  -     metFORMIN (Glucophage) 1000 MG tablet; Take 1 tablet by mouth 2 (Two) Times a Day With Meals.  -     insulin regular (HumuLIN R) 100 UNIT/ML injection; Inject 10 Units under the skin into the appropriate area as directed 3 (Three) Times a Day Before Meals.  -     Alcohol Swabs pads; Use swab as needed to clean insulin vial and to clean skin before injection or finger stick.  -     Insulin Glargine (BASAGLAR KWIKPEN) 100 UNIT/ML injection pen; Inject 20 Units under the skin into the appropriate area as directed Every Night.  -     lisinopril (PRINIVIL,ZESTRIL) 10 MG tablet; Take 1 tablet by mouth Daily.  -     Insulin Syringes, Disposable, U-100 0.3 ML misc; 1 syringe As Needed (use with each Humulin R dose administration).  -     Ambulatory Referral to Podiatry    Chronic left shoulder pain  -     XR Shoulder 2+ View Left (In Office)  -     Ambulatory Referral to Orthopedic Surgery    Right elbow pain  -     XR Elbow 2 View Right (In Office)  -     Ambulatory Referral to Orthopedic Surgery    Restless leg syndrome  -     rOPINIRole (Requip) 0.5 MG tablet; Take 2 tablets by mouth Every Night. Take 1 hour before bedtime.    Diabetic neuropathy, painful (CMS/HCC)  -     gabapentin (NEURONTIN) 300 MG capsule; Take 1 capsule by mouth 3 (Three) Times a Day.    Insomnia, unspecified type  -     traZODone (DESYREL) 50 MG tablet; Take 1 tablet by mouth Every Night.    1. Refills submitted today. Take all prescribed medication as directed.  2. MABLE #[id number] obtained and reviewed.   3. Gabapentin filled with understanding only 30 day supply, then additional will need to be obtained from PM. Will submit new PM referral if needed - pt will let me know.  4. Discussed importance of compliance with diabetic medications and diet. Has appt with  endocrinology on 5/18/2020.  5. Xrays of shoulder and elbow obtained today. Will refer to ortho for further evaluation.  6. Referral to podiatry for diabetic foot exam ordered.  7. Pt to make appt for diabetic eye exam as soon as possible.  8. RTC 3 mos for annual PE or PRN      This document has been electronically signed by KAYLAN Kendall on May 4, 2020 16:15

## 2020-05-05 ENCOUNTER — TELEPHONE (OUTPATIENT)
Dept: FAMILY MEDICINE CLINIC | Facility: CLINIC | Age: 35
End: 2020-05-05

## 2020-05-05 DIAGNOSIS — G89.29 OTHER CHRONIC PAIN: Primary | ICD-10-CM

## 2020-05-05 NOTE — TELEPHONE ENCOUNTER
Patient had got a referral for pain management previously from is old PCP but never went so it is no longer good. Needs another one put in for him for [place] for his back pain.

## 2020-05-05 NOTE — TELEPHONE ENCOUNTER
Patient called in requesting to speak with Ms. Danay Goodwin or her nurse to provide an update on pain management. He states another referral is needed since his last one has  and is needing the referral placed in [place]. Please call patient back to discuss. Call back number provided is [phone]

## 2020-05-05 NOTE — TELEPHONE ENCOUNTER
----- Message from KAYLAN Kendall sent at 5/5/2020  7:41 AM CDT -----  Please notify pt that xray of left shoulder and right elbow were negative. Referral to ortho is in process. Also, please remind him of his endocrinology appt on 5/18. Thanks

## 2020-05-18 ENCOUNTER — OFFICE VISIT (OUTPATIENT)
Dept: ENDOCRINOLOGY | Facility: CLINIC | Age: 35
End: 2020-05-18

## 2020-05-18 ENCOUNTER — APPOINTMENT (OUTPATIENT)
Dept: LAB | Facility: HOSPITAL | Age: 35
End: 2020-05-18

## 2020-05-18 ENCOUNTER — OFFICE VISIT (OUTPATIENT)
Dept: ORTHOPEDIC SURGERY | Facility: CLINIC | Age: 35
End: 2020-05-18

## 2020-05-18 ENCOUNTER — TELEPHONE (OUTPATIENT)
Dept: ENDOCRINOLOGY | Facility: CLINIC | Age: 35
End: 2020-05-18

## 2020-05-18 VITALS — HEIGHT: 72 IN | WEIGHT: 221 LBS | BODY MASS INDEX: 29.93 KG/M2

## 2020-05-18 VITALS
DIASTOLIC BLOOD PRESSURE: 80 MMHG | HEIGHT: 72 IN | OXYGEN SATURATION: 96 % | BODY MASS INDEX: 30.26 KG/M2 | HEART RATE: 110 BPM | SYSTOLIC BLOOD PRESSURE: 120 MMHG | WEIGHT: 223.4 LBS

## 2020-05-18 DIAGNOSIS — M25.512 LEFT SHOULDER PAIN, UNSPECIFIED CHRONICITY: Primary | ICD-10-CM

## 2020-05-18 DIAGNOSIS — E11.69 MIXED DIABETIC HYPERLIPIDEMIA ASSOCIATED WITH TYPE 2 DIABETES MELLITUS (HCC): ICD-10-CM

## 2020-05-18 DIAGNOSIS — E11.59 HYPERTENSION ASSOCIATED WITH DIABETES (HCC): ICD-10-CM

## 2020-05-18 DIAGNOSIS — Z79.4 TYPE 2 DIABETES MELLITUS WITH HYPERGLYCEMIA, WITH LONG-TERM CURRENT USE OF INSULIN (HCC): Primary | ICD-10-CM

## 2020-05-18 DIAGNOSIS — I15.2 HYPERTENSION ASSOCIATED WITH DIABETES (HCC): ICD-10-CM

## 2020-05-18 DIAGNOSIS — E11.65 TYPE 2 DIABETES MELLITUS WITH HYPERGLYCEMIA, WITH LONG-TERM CURRENT USE OF INSULIN (HCC): Primary | ICD-10-CM

## 2020-05-18 DIAGNOSIS — E11.42 POLYNEUROPATHY DUE TO TYPE 2 DIABETES MELLITUS (HCC): ICD-10-CM

## 2020-05-18 DIAGNOSIS — E78.2 MIXED DIABETIC HYPERLIPIDEMIA ASSOCIATED WITH TYPE 2 DIABETES MELLITUS (HCC): ICD-10-CM

## 2020-05-18 DIAGNOSIS — M75.02 ADHESIVE CAPSULITIS OF LEFT SHOULDER: ICD-10-CM

## 2020-05-18 DIAGNOSIS — M77.11 LATERAL EPICONDYLITIS, RIGHT ELBOW: ICD-10-CM

## 2020-05-18 LAB
ALBUMIN SERPL-MCNC: 4.3 G/DL (ref 3.5–5.2)
ALBUMIN/GLOB SERPL: 1.4 G/DL
ALP SERPL-CCNC: 84 U/L (ref 39–117)
ALT SERPL W P-5'-P-CCNC: 27 U/L (ref 1–41)
ANION GAP SERPL CALCULATED.3IONS-SCNC: 14.8 MMOL/L (ref 5–15)
AST SERPL-CCNC: 14 U/L (ref 1–40)
BILIRUB SERPL-MCNC: 0.2 MG/DL (ref 0.2–1.2)
BUN BLD-MCNC: 9 MG/DL (ref 6–20)
BUN/CREAT SERPL: 9.5 (ref 7–25)
CALCIUM SPEC-SCNC: 9.5 MG/DL (ref 8.6–10.5)
CHLORIDE SERPL-SCNC: 96 MMOL/L (ref 98–107)
CO2 SERPL-SCNC: 23.2 MMOL/L (ref 22–29)
CREAT BLD-MCNC: 0.95 MG/DL (ref 0.76–1.27)
GFR SERPL CREATININE-BSD FRML MDRD: 90 ML/MIN/1.73
GLOBULIN UR ELPH-MCNC: 3.1 GM/DL
GLUCOSE BLD-MCNC: 535 MG/DL (ref 65–99)
HBA1C MFR BLD: 8.7 % (ref 4.8–5.6)
POTASSIUM BLD-SCNC: 4.1 MMOL/L (ref 3.5–5.2)
PROT SERPL-MCNC: 7.4 G/DL (ref 6–8.5)
SODIUM BLD-SCNC: 134 MMOL/L (ref 136–145)

## 2020-05-18 PROCEDURE — 99204 OFFICE O/P NEW MOD 45 MIN: CPT | Performed by: INTERNAL MEDICINE

## 2020-05-18 PROCEDURE — 84681 ASSAY OF C-PEPTIDE: CPT | Performed by: INTERNAL MEDICINE

## 2020-05-18 PROCEDURE — 80053 COMPREHEN METABOLIC PANEL: CPT | Performed by: INTERNAL MEDICINE

## 2020-05-18 PROCEDURE — 36415 COLL VENOUS BLD VENIPUNCTURE: CPT | Performed by: INTERNAL MEDICINE

## 2020-05-18 PROCEDURE — 83036 HEMOGLOBIN GLYCOSYLATED A1C: CPT | Performed by: INTERNAL MEDICINE

## 2020-05-18 PROCEDURE — 99203 OFFICE O/P NEW LOW 30 MIN: CPT | Performed by: ORTHOPAEDIC SURGERY

## 2020-05-18 RX ORDER — NORTRIPTYLINE HYDROCHLORIDE 10 MG/1
10 CAPSULE ORAL NIGHTLY
Qty: 30 CAPSULE | Refills: 5 | Status: SHIPPED | OUTPATIENT
Start: 2020-05-18

## 2020-05-18 RX ORDER — GABAPENTIN 600 MG/1
TABLET ORAL
Qty: 150 TABLET | Refills: 3 | Status: SHIPPED | OUTPATIENT
Start: 2020-05-18 | End: 2020-06-27

## 2020-05-18 NOTE — PROGRESS NOTES
" Cal Badillo is a 34 y.o. male who presents for  evaluation of   Chief Complaint   Patient presents with   • Diabetes     IN OFFICE VISIT    Patient's phone number [phone]    Referring provider    Danay Goodwin APRN  [address] [place] [address]    Primary Care Provider    Danay Goodwin APRN    Duration Since Age 29 years     Timing - Diabetes is Constant    Quality -  poorly controlled    Severity -  severe    Complications - peripheral neuropathy    Current symptoms/problems  increase appetite, paresthesia of the feet, polydipsia, polyuria and weight loss     Alleviating Factors: Compliance      Side Effects  none    Current diet  in general, a \"healthy\" diet      Current exercise walking    Current monitoring regimen: home blood tests - checking 4 x daily   Home blood sugar records:     Hypoglycemia unawareness and nocturnal    Past Medical History:   Diagnosis Date   • COPD (chronic obstructive pulmonary disease) (CMS/[facility])    • Diabetes mellitus (CMS/[facility])    • Diabetic polyneuropathy (CMS/[facility])    • Lumbar radiculopathy, chronic 2017   • Sleep apnea      Family History   Problem Relation Age of Onset   • Hypertension Other    • Diabetes Other      Social History     Tobacco Use   • Smoking status: Never Smoker   • Smokeless tobacco: Never Used   Substance Use Topics   • Alcohol use: Never     Frequency: Never   • Drug use: Never         Current Outpatient Medications:   •  Alcohol Swabs pads, Use swab as needed to clean insulin vial and to clean skin before injection or finger stick., Disp: 120 each, Rfl: 5  •  atorvastatin (LIPITOR) 10 MG tablet, Take 1 tablet by mouth Daily., Disp: 30 tablet, Rfl: 5  •  Blood Glucose Monitoring Suppl (ONE TOUCH ULTRA 2) w/Device kit, , Disp: , Rfl:   •  glucose blood (ONE TOUCH ULTRA TEST) test strip, Check blood sugar 2 times daily dx e11.9, Disp: 100 each, Rfl: 12  •  glucose monitor monitoring kit, 1 each Take As Directed. Check BS 1 hr before meals, " "2 hrs after meals and at bedtime, Disp: 1 each, Rfl: 0  •  Insulin Glargine (BASAGLAR KWIKPEN) 100 UNIT/ML injection pen, Inject 20 Units under the skin into the appropriate area as directed Every Night., Disp: 3 mL, Rfl: 5  •  Insulin Pen Needle (PEN NEEDLES) 31G X 8 MM misc, 1 each Daily., Disp: 30 each, Rfl: 11  •  Insulin Syringes, Disposable, U-100 0.3 ML misc, 1 syringe As Needed (use with each Humulin R dose administration)., Disp: 100 each, Rfl: 5  •  Lancets (ONETOUCH ULTRASOFT) lancets, Use to obtain blood sample to test blood sugar at least 3 times daily, Disp: 200 each, Rfl: 5  •  lisinopril (PRINIVIL,ZESTRIL) 10 MG tablet, Take 1 tablet by mouth Daily., Disp: 30 tablet, Rfl: 5  •  metFORMIN (Glucophage) 1000 MG tablet, Take 1 tablet by mouth 2 (Two) Times a Day With Meals., Disp: 30 tablet, Rfl: 5  •  rOPINIRole (Requip) 0.5 MG tablet, Take 2 tablets by mouth Every Night. Take 1 hour before bedtime., Disp: 30 tablet, Rfl: 5  •  traZODone (DESYREL) 50 MG tablet, Take 1 tablet by mouth Every Night., Disp: 30 tablet, Rfl: 0  •  TRUEPLUS INSULIN SYRINGE 31G X 5/16\" 0.3 ML misc, , Disp: , Rfl:   •  gabapentin (Neurontin) 600 MG tablet, 600mg po five times daily, Disp: 150 tablet, Rfl: 3  •  insulin lispro (Admelog) 100 UNIT/ML injection, Up to 150 units daily through pump, Disp: 50 mL, Rfl: 11    Review of Systems    Review of Systems   Constitutional: Positive for fatigue and unexpected weight change. Negative for activity change, appetite change, chills, diaphoresis and fever.   HENT: Negative for congestion, dental problem, drooling, ear discharge, ear pain, facial swelling, mouth sores, postnasal drip, rhinorrhea, sinus pressure, sore throat, tinnitus, trouble swallowing and voice change.    Eyes: Negative for photophobia, pain, discharge, redness, itching and visual disturbance.   Respiratory: Negative for apnea, cough, choking, chest tightness, shortness of breath, wheezing and stridor.  " "  Cardiovascular: Negative for chest pain, palpitations and leg swelling.   Gastrointestinal: Negative for abdominal distention, abdominal pain, constipation, diarrhea, nausea and vomiting.   Endocrine: Positive for polydipsia, polyphagia and polyuria. Negative for cold intolerance and heat intolerance.   Genitourinary: Negative for decreased urine volume, difficulty urinating, dysuria, flank pain, frequency, hematuria and urgency.   Musculoskeletal: Positive for arthralgias and myalgias. Negative for back pain, gait problem, joint swelling, neck pain and neck stiffness.   Skin: Negative for color change, pallor, rash and wound.   Allergic/Immunologic: Negative for immunocompromised state.   Neurological: Positive for weakness. Negative for dizziness, tremors, seizures, syncope, facial asymmetry, speech difficulty, light-headedness, numbness and headaches.   Hematological: Negative for adenopathy.   Psychiatric/Behavioral: Negative for agitation, behavioral problems, confusion, decreased concentration, dysphoric mood, hallucinations, self-injury, sleep disturbance and suicidal ideas. The patient is not nervous/anxious and is not hyperactive.         Objective:   /80   Pulse 110   Ht 182.9 cm (72\")   Wt 101 kg (223 lb 6.4 oz)   SpO2 96%   BMI 30.30 kg/m²     Physical Exam   Constitutional: He is oriented to person, place, and time. He appears well-developed and well-nourished. He is cooperative.   HENT:   Head: Normocephalic and atraumatic.   Right Ear: External ear normal.   Left Ear: External ear normal.   Nose: Nose normal.   Mouth/Throat: Oropharynx is clear and moist. No oropharyngeal exudate.   Eyes: Pupils are equal, round, and reactive to light. Conjunctivae and EOM are normal. No scleral icterus. Right eye exhibits normal extraocular motion. Left eye exhibits normal extraocular motion.   Neck: Neck supple. No JVD present. No muscular tenderness present. No tracheal deviation, no edema and no " erythema present. No thyromegaly present.   Cardiovascular: Normal rate, regular rhythm, normal heart sounds and intact distal pulses. Exam reveals no gallop and no friction rub.   No murmur heard.  Pulmonary/Chest: Effort normal and breath sounds normal. No stridor. No respiratory distress. He has no decreased breath sounds. He has no wheezes. He has no rhonchi. He has no rales. He exhibits no tenderness.   Abdominal: Soft. Bowel sounds are normal. He exhibits no distension and no mass. There is no hepatomegaly. There is no tenderness. There is no rebound and no guarding. No hernia.   Musculoskeletal: Normal range of motion. He exhibits no edema, tenderness or deformity.   Lymphadenopathy:     He has no cervical adenopathy.   Neurological: He is alert and oriented to person, place, and time. He has normal reflexes. No cranial nerve deficit. He exhibits normal muscle tone. Coordination normal.   Skin: Skin is warm. No rash noted. No erythema. No pallor.   Psychiatric: He has a normal mood and affect. His behavior is normal. Judgment and thought content normal.   Nursing note and vitals reviewed.      Lab Review    Results for orders placed or performed in visit on 02/26/20   Comprehensive Metabolic Panel   Result Value Ref Range    Glucose 512 (C) 65 - 99 mg/dL    BUN 9 6 - 20 mg/dL    Creatinine 0.92 0.76 - 1.27 mg/dL    Sodium 130 (L) 136 - 145 mmol/L    Potassium 4.2 3.5 - 5.2 mmol/L    Chloride 90 (L) 98 - 107 mmol/L    CO2 25.6 22.0 - 29.0 mmol/L    Calcium 9.5 8.6 - 10.5 mg/dL    Total Protein 7.0 6.0 - 8.5 g/dL    Albumin 4.10 3.50 - 5.20 g/dL    ALT (SGPT) 23 1 - 41 U/L    AST (SGOT) 15 1 - 40 U/L    Alkaline Phosphatase 88 39 - 117 U/L    Total Bilirubin 0.3 0.2 - 1.2 mg/dL    eGFR Non African Amer 93 >60 mL/min/1.73    Globulin 2.9 gm/dL    A/G Ratio 1.4 g/dL    BUN/Creatinine Ratio 9.8 7.0 - 25.0    Anion Gap 14.4 5.0 - 15.0 mmol/L   Hemoglobin A1c   Result Value Ref Range    Hemoglobin A1C 12.80 (H) 4.80  - 5.60 %   MicroAlbumin, Urine, Random - Urine, Clean Catch   Result Value Ref Range    Microalbumin, Urine <1.2 mg/dL       Component      Latest Ref Rng & Units 5/18/2020   Glucose      65 - 99 mg/dL 535 (HH)   BUN      6 - 20 mg/dL 9   Creatinine      0.76 - 1.27 mg/dL 0.95   Sodium      136 - 145 mmol/L 134 (L)   Potassium      3.5 - 5.2 mmol/L 4.1   Chloride      98 - 107 mmol/L 96 (L)   CO2      22.0 - 29.0 mmol/L 23.2   Calcium      8.6 - 10.5 mg/dL 9.5   Total Protein      6.0 - 8.5 g/dL 7.4   Albumin      3.50 - 5.20 g/dL 4.30   ALT (SGPT)      1 - 41 U/L 27   AST (SGOT)      1 - 40 U/L 14   Alkaline Phosphatase      39 - 117 U/L 84   Total Bilirubin      0.2 - 1.2 mg/dL 0.2   eGFR Non African Am      >60 mL/min/1.73 90   Globulin      gm/dL 3.1   A/G Ratio      g/dL 1.4   BUN/Creatinine Ratio      7.0 - 25.0 9.5   Anion Gap      5.0 - 15.0 mmol/L 14.8   Hemoglobin A1C      4.80 - 5.60 % 8.70 (H)   C-Peptide      1.1 - 4.4 ng/mL 6.2 (H)         Assessment/Plan       ICD-10-CM ICD-9-CM   1. Type 2 diabetes mellitus with hyperglycemia, with long-term current use of insulin (CMS/HCC) E11.65 250.00    Z79.4 790.29     V58.67   2. Hypertension associated with diabetes (CMS/HCC) E11.59 250.80    I10 401.9   3. Mixed diabetic hyperlipidemia associated with type 2 diabetes mellitus (CMS/HCC) E11.69 250.80    E78.2 272.2   4. Polyneuropathy due to type 2 diabetes mellitus (CMS/HCC) E11.42 250.60     357.2         I reviewed and summarized records from Danay Goodwin APRN from current year  and I reviewed / ordered labs.   From review of records :    Pt has type 2 diabetes with hyperglycemia        Glycemic Management:   Lab Results   Component Value Date    HGBA1C 12.80 (H) 02/26/2020     Lab Results   Component Value Date    GLUCOSE 512 (C) 02/26/2020    BUN 9 02/26/2020    CREATININE 0.92 02/26/2020    EGFRIFNONA 93 02/26/2020    BCR 9.8 02/26/2020    K 4.2 02/26/2020    CO2 25.6 02/26/2020    CALCIUM 9.5  02/26/2020    ALBUMIN 4.10 02/26/2020    AST 15 02/26/2020    ALT 23 02/26/2020    ANIONGAP 14.4 02/26/2020     No results found for: WBC, HGB, HCT, MCV, PLT    Basaglar 30 units once daily     Humulin R doing 2 - 20 units with meals   Doing only sliding scale     Add 1 unit per 8 grams     Approve for Insulin Pump and or Continuous Glucose Sensor     #1  Patient has diabetes mellitus, insulin-dependent.    #2 He performs blood glucose testing at least times daily and blood glucose log was brought to office with variability from .    #3  He is requiring  Basal insulin  and Prandial Insulin for a total of at least  4 injections per day and has been doing this for at least 6 months.     #4 Patient tests blood sugars at least 4 times daily and makes frequent self-adjustments and patient is injecting insulin at least 4 times daily. He has been doing this for more than 6 months . He tests frequently due to hypoglycemia and hyperglycemia.     #5 I have personally seen patient within the past 6 months    #6 We plan on seeing her every 2-3 months for continuous adjustment of her diabetes regimen     #7 patient has hypoglycemia with episodes of unawareness.    #8 patient has day-to-day variation in her mealtime which confounds the degree of insulin dosing with multiple daily injections.    #9 patient has completed diabetes education program with us.    #10 He has demonstrated the ability to self monitor her glucose.        #11 Patient is motivated in improving  diabetes control         Lipid Management  No results found for: CHOL  No results found for: TRIG  No results found for: HDL  No components found for: LDLCALC  No results found for: LDL  No results found for: LDLDIRECT    On lipitor 10 mg qhs       Blood Pressure Management:    Vitals:    05/18/20 1319   BP: 120/80   Pulse: 110   SpO2: 96%     Lab Results   Component Value Date    GLUCOSE 512 (C) 02/26/2020    CALCIUM 9.5 02/26/2020     (L) 02/26/2020    K  4.2 2020    CO2 25.6 2020    CL 90 (L) 2020    BUN 9 2020    CREATININE 0.92 2020    EGFRIFNONA 93 2020    BCR 9.8 2020    ANIONGAP 14.4 2020        On lisinopril 10 mg daily         Microvascular Complication Monitoring:      Eye Exam Evaluation  Within 1 year   -----------    Last Microalbumin-Proteinuria Assessment    No results found for: MALBCRERATIO    No results found for: UTPCR    -----------      Neuropathy  Yes    On neurontin       Weight Related:   Wt Readings from Last 3 Encounters:   20 101 kg (223 lb 6.4 oz)   20 100 kg (221 lb)   20 101 kg (222 lb)     Body mass index is 30.3 kg/m².        Diet interventions: moderate (500 kCal/d) deficit diet.      Bone Health    Lab Results   Component Value Date    CALCIUM 9.5 2020       Thyroid Health    No results found for: TSH               Other Diabetes Related Aspects       No results found for: [id number]         No orders of the defined types were placed in this encounter.        A copy of my note was sent to Danay Goodwin APRN    Please see my above opinion and suggestions.           This document has been electronically signed by Vasu Lezama MD on May 18, 2020 14:06        Patient Demographics     Patient Name  Cal Badillo Sex  Male   1985 N   Address  [address] [place] [address] Phone  [phone] (Home)   Emergency Contact(s)     Name Relation Home Work Mobile   Contact,No Other   [phone]   PCP and Center     Primary Care Provider Phone Center   KAYLAN Kendall [phone] None   Patient Employment     Status   Unknown   Active Insurance as of 2020     [employer] [place] [place] [employer] [place] [place]     Payor Plan Insurance Group Employer/Plan Group   [employer] [place] [place] [employer] [place] [place]     Payor Plan Address Payor Plan Phone Number Payor Plan Fax Number Effective Dates   [address] [address]   2020 - None  "Entered   [place] [address]      Subscriber Name Subscriber Birth Date Member ID    ROSITA ALCANTARA 1985 [id number]    Current Medications     Alcohol Swabs pads   atorvastatin (LIPITOR) 10 MG tablet   Blood Glucose Monitoring Suppl (ONE TOUCH ULTRA 2) w/Device kit   glucose blood (ONE TOUCH ULTRA TEST) test strip   glucose monitor monitoring kit   Insulin Glargine (BASAGLAR KWIKPEN) 100 UNIT/ML injection pen   Insulin Pen Needle (PEN NEEDLES) 31G X 8 MM misc   Insulin Syringes, Disposable, U-100 0.3 ML misc   Lancets (ONETOUCH ULTRASOFT) lancets   lisinopril (PRINIVIL,ZESTRIL) 10 MG tablet   metFORMIN (Glucophage) 1000 MG tablet   rOPINIRole (Requip) 0.5 MG tablet   traZODone (DESYREL) 50 MG tablet   TRUEPLUS INSULIN SYRINGE 31G X 5/16\" 0.3 ML misc   gabapentin (NEURONTIN) 300 MG capsule (Discontinued)   insulin regular (HumuLIN R) 100 UNIT/ML injection (Discontinued)   gabapentin (Neurontin) 600 MG tablet   insulin lispro (Admelog) 100 UNIT/ML injection   Allergies     Tramadol Other (See Comments)   Mood Swings   Future Appointments      Provider Department Center   5/27/2020 8:00 AM Mariel Rodriguez, PT DPT [facility] THERAPY [place]   5/27/2020 10:00 AM Olegario Jacobo, DPM [facility] PODIATRY    6/26/2020 2:40 PM Uli Cortes MD [facility] ORTHOPEDICS    8/5/2020 2:00 PM Danay Goodwin APRN [facility] FAMILY MEDICINE [place]        "

## 2020-05-18 NOTE — PROGRESS NOTES
Cal Badillo is a 34 y.o. male   Primary provider:  Danay Goodwin APRN       Chief Complaint   Patient presents with   • Left Shoulder - Pain       HISTORY OF PRESENT ILLNESS: Left shoulder pain. No known injury to the shoulder. He is unable to raise his pain and lift objects.     Arm Pain    The incident occurred at home. There was no injury mechanism. The pain is present in the left shoulder. The quality of the pain is described as burning, aching and stabbing. The pain radiates to the left arm and left hand. The pain is at a severity of 8/10. The pain is severe. The pain has been constant since the incident. Associated symptoms include numbness. Nothing aggravates the symptoms. He has tried ice, heat and rest for the symptoms. The treatment provided no relief.        CONCURRENT MEDICAL HISTORY:    Past Medical History:   Diagnosis Date   • Diabetes mellitus (CMS/HCC)    • Diabetic polyneuropathy (CMS/HCC)    • Lumbar radiculopathy, chronic 2017       Allergies   Allergen Reactions   • Tramadol Other (See Comments)     Mood Swings         Current Outpatient Medications:   •  Alcohol Swabs pads, Use swab as needed to clean insulin vial and to clean skin before injection or finger stick., Disp: 120 each, Rfl: 5  •  atorvastatin (LIPITOR) 10 MG tablet, Take 1 tablet by mouth Daily., Disp: 30 tablet, Rfl: 5  •  Blood Glucose Monitoring Suppl (ONE TOUCH ULTRA 2) w/Device kit, , Disp: , Rfl:   •  gabapentin (NEURONTIN) 300 MG capsule, Take 1 capsule by mouth 3 (Three) Times a Day., Disp: 90 capsule, Rfl: 0  •  glucose blood (ONE TOUCH ULTRA TEST) test strip, Check blood sugar 2 times daily dx e11.9, Disp: 100 each, Rfl: 12  •  glucose monitor monitoring kit, 1 each Take As Directed. Check BS 1 hr before meals, 2 hrs after meals and at bedtime, Disp: 1 each, Rfl: 0  •  Insulin Glargine (BASAGLAR KWIKPEN) 100 UNIT/ML injection pen, Inject 20 Units under the skin into the appropriate area as directed Every Night., Disp:  "3 mL, Rfl: 5  •  Insulin Pen Needle (PEN NEEDLES) 31G X 8 MM misc, 1 each Daily., Disp: 30 each, Rfl: 11  •  insulin regular (HumuLIN R) 100 UNIT/ML injection, Inject 10 Units under the skin into the appropriate area as directed 3 (Three) Times a Day Before Meals., Disp: 10 mL, Rfl: 5  •  Insulin Syringes, Disposable, U-100 0.3 ML misc, 1 syringe As Needed (use with each Humulin R dose administration)., Disp: 100 each, Rfl: 5  •  Lancets (ONETOUCH ULTRASOFT) lancets, Use to obtain blood sample to test blood sugar at least 3 times daily, Disp: 200 each, Rfl: 5  •  lisinopril (PRINIVIL,ZESTRIL) 10 MG tablet, Take 1 tablet by mouth Daily., Disp: 30 tablet, Rfl: 5  •  metFORMIN (Glucophage) 1000 MG tablet, Take 1 tablet by mouth 2 (Two) Times a Day With Meals., Disp: 30 tablet, Rfl: 5  •  rOPINIRole (Requip) 0.5 MG tablet, Take 2 tablets by mouth Every Night. Take 1 hour before bedtime., Disp: 30 tablet, Rfl: 5  •  traZODone (DESYREL) 50 MG tablet, Take 1 tablet by mouth Every Night., Disp: 30 tablet, Rfl: 0  •  TRUEPLUS INSULIN SYRINGE 31G X 5/16\" 0.3 ML misc, , Disp: , Rfl:     No past surgical history on file.    No family history on file.     Social History     Socioeconomic History   • Marital status:      Spouse name: Not on file   • Number of children: Not on file   • Years of education: Not on file   • Highest education level: Not on file   Tobacco Use   • Smoking status: Never Smoker   • Smokeless tobacco: Never Used   Substance and Sexual Activity   • Alcohol use: Never     Frequency: Never   • Drug use: Never   • Sexual activity: Yes     Partners: Female        Review of Systems   Gastrointestinal: Positive for abdominal pain and diarrhea.   Endocrine:        Blood in urine     Musculoskeletal: Positive for joint swelling, myalgias and neck stiffness.   Neurological: Positive for numbness and headaches.   Psychiatric/Behavioral: The patient is nervous/anxious.        PHYSICAL EXAMINATION:       There " were no vitals taken for this visit.    Physical Exam    GAIT:     []  Normal  []  Antalgic    Assistive device: []  None  []  Walker     []  Crutches  []  Cane     []  Wheelchair  []  Stretcher    Ortho Exam      Xr Shoulder 2+ View Left (in Office)    Result Date: 5/4/2020  Narrative: PROCEDURE: XR SHOULDER 2+ VW LEFT VIEWS: 3 INDICATION: Pain COMPARISON: None FINDINGS:   - fracture: None   - alignment: Within normal limits   - misc: No significant soft tissue abnormality identified     Impression: Negative examination for age. Note:  if pain or symptoms persist beyond reasonable expectations and follow-up imaging is anticipated,  cross sectional imaging  (CT and/or MRI) is suggested, as is deemed clinically appropriate. Electronically signed by:  Dionne Church MD  5/4/2020 7:57 PM CDT Workstation: [id number]    Xr Elbow 2 View Right (in Office)    Result Date: 5/4/2020  Narrative: PROCEDURE: XR ELBOW 2 VW RIGHT VIEWS: 2 INDICATION: Pain COMPARISON: None FINDINGS:   - fracture: None   - alignment: Within normal limits   - misc: No joint effusion or other significant soft tissue abnormality identified     Impression: Negative examination for age. Note:  if pain or symptoms persist beyond reasonable expectations and follow-up imaging is anticipated,  cross sectional imaging  (CT and/or MRI) is suggested, as is deemed clinically appropriate. Electronically signed by:  Dionne Church MD  5/4/2020 7:52 PM CDT Workstation: [id number]          ASSESSMENT:    Diagnoses and all orders for this visit:    Left shoulder pain, unspecified chronicity          PLAN    No follow-ups on file.        This document has been electronically signed by Edie Amato on May 18, 2020 10:07

## 2020-05-18 NOTE — PROGRESS NOTES
Cal Badillo is a 34 y.o. male   Primary provider:  Danay Goodwin APRN       Chief Complaint   Patient presents with   • Left Shoulder - Pain       HISTORY OF PRESENT ILLNESS: 34-year-old with severe diabetes who is had left shoulder pain for several months.  He has lost motion in his shoulder decreased ability to use it.  He works as a  and has a hard time doing this.  Subsequent to that he developed pain in his dominant right elbow.  He has difficult time straightening out is worse with activity better with rest.  There is been no injury to either side.  He tells me he has brittle diabetes and is seeing Dr. Lezama today.  His sugars range between 400-600.    History of Present Illness     CONCURRENT MEDICAL HISTORY:    Past Medical History:   Diagnosis Date   • COPD (chronic obstructive pulmonary disease) (CMS/[facility])    • Diabetes mellitus (CMS/[facility])    • Diabetic polyneuropathy (CMS/[facility])    • Lumbar radiculopathy, chronic 2017   • Sleep apnea        Allergies   Allergen Reactions   • Tramadol Other (See Comments)     Mood Swings         Current Outpatient Medications:   •  Alcohol Swabs pads, Use swab as needed to clean insulin vial and to clean skin before injection or finger stick., Disp: 120 each, Rfl: 5  •  atorvastatin (LIPITOR) 10 MG tablet, Take 1 tablet by mouth Daily., Disp: 30 tablet, Rfl: 5  •  Blood Glucose Monitoring Suppl (ONE TOUCH ULTRA 2) w/Device kit, , Disp: , Rfl:   •  gabapentin (NEURONTIN) 300 MG capsule, Take 1 capsule by mouth 3 (Three) Times a Day., Disp: 90 capsule, Rfl: 0  •  glucose blood (ONE TOUCH ULTRA TEST) test strip, Check blood sugar 2 times daily dx e11.9, Disp: 100 each, Rfl: 12  •  glucose monitor monitoring kit, 1 each Take As Directed. Check BS 1 hr before meals, 2 hrs after meals and at bedtime, Disp: 1 each, Rfl: 0  •  Insulin Glargine (BASAGLAR KWIKPEN) 100 UNIT/ML injection pen, Inject 20 Units under the skin into the appropriate area as directed Every Night.,  "Disp: 3 mL, Rfl: 5  •  Insulin Pen Needle (PEN NEEDLES) 31G X 8 MM misc, 1 each Daily., Disp: 30 each, Rfl: 11  •  insulin regular (HumuLIN R) 100 UNIT/ML injection, Inject 10 Units under the skin into the appropriate area as directed 3 (Three) Times a Day Before Meals., Disp: 10 mL, Rfl: 5  •  Insulin Syringes, Disposable, U-100 0.3 ML misc, 1 syringe As Needed (use with each Humulin R dose administration)., Disp: 100 each, Rfl: 5  •  Lancets (ONETOUCH ULTRASOFT) lancets, Use to obtain blood sample to test blood sugar at least 3 times daily, Disp: 200 each, Rfl: 5  •  lisinopril (PRINIVIL,ZESTRIL) 10 MG tablet, Take 1 tablet by mouth Daily., Disp: 30 tablet, Rfl: 5  •  metFORMIN (Glucophage) 1000 MG tablet, Take 1 tablet by mouth 2 (Two) Times a Day With Meals., Disp: 30 tablet, Rfl: 5  •  rOPINIRole (Requip) 0.5 MG tablet, Take 2 tablets by mouth Every Night. Take 1 hour before bedtime., Disp: 30 tablet, Rfl: 5  •  traZODone (DESYREL) 50 MG tablet, Take 1 tablet by mouth Every Night., Disp: 30 tablet, Rfl: 0  •  TRUEPLUS INSULIN SYRINGE 31G X 5/16\" 0.3 ML misc, , Disp: , Rfl:     Past Surgical History:   Procedure Laterality Date   • APPENDECTOMY     • CUBITAL TUNNEL RELEASE Left    • GALLBLADDER SURGERY         Family History   Problem Relation Age of Onset   • Hypertension Other    • Diabetes Other         Social History     Socioeconomic History   • Marital status:      Spouse name: Not on file   • Number of children: Not on file   • Years of education: Not on file   • Highest education level: Not on file   Tobacco Use   • Smoking status: Never Smoker   • Smokeless tobacco: Never Used   Substance and Sexual Activity   • Alcohol use: Never     Frequency: Never   • Drug use: Never   • Sexual activity: Yes     Partners: Female        Review of Systems   Constitutional: Positive for activity change. Negative for chills and fever.   HENT: Negative for facial swelling.    Eyes: Negative for photophobia. " "  Respiratory: Negative for apnea and shortness of breath.    Cardiovascular: Negative for chest pain and leg swelling.   Gastrointestinal: Negative for abdominal pain, nausea and vomiting.   Endocrine: Positive for polydipsia and polyuria.   Genitourinary: Negative for dysuria.   Musculoskeletal: Positive for arthralgias, joint swelling and myalgias.   Skin: Negative for color change and rash.   Neurological: Negative for seizures and syncope.   Psychiatric/Behavioral: Negative for behavioral problems and dysphoric mood.       PHYSICAL EXAMINATION:       Ht 182.9 cm (72\")   Wt 100 kg (221 lb)   BMI 29.97 kg/m²     Physical Exam   Constitutional: He is oriented to person, place, and time. He appears well-developed.   HENT:   Head: Normocephalic and atraumatic.   Eyes: Pupils are equal, round, and reactive to light. EOM are normal.   Neck: Neck supple. No tracheal deviation present.   Pulmonary/Chest: Effort normal.   Musculoskeletal: He exhibits tenderness. He exhibits no edema or deformity.   Neurological: He is alert and oriented to person, place, and time.   Skin: Skin is warm and dry. No erythema.   Psychiatric: He has a normal mood and affect.       GAIT:     []  Normal  []  Antalgic    Assistive device: [x]  None  []  Walker     []  Crutches  []  Cane     []  Wheelchair  []  Stretcher    Ortho Exam  Very limited abduction, external rotation and forward flexion of the left arm.  Is grossly neurologically intact.  He is tender about the anterior shoulder.  Lacks about 5 to 10 degrees of full extension of the right elbow tender over the lateral condyle pain resisted extension he is neurovascular intact.  Xr Shoulder 2+ View Left (in Office)    Result Date: 5/4/2020  Narrative: PROCEDURE: XR SHOULDER 2+ VW LEFT VIEWS: 3 INDICATION: Pain COMPARISON: None FINDINGS:   - fracture: None   - alignment: Within normal limits   - misc: No significant soft tissue abnormality identified     Impression: Negative examination " for age. Note:  if pain or symptoms persist beyond reasonable expectations and follow-up imaging is anticipated,  cross sectional imaging  (CT and/or MRI) is suggested, as is deemed clinically appropriate. Electronically signed by:  Dionne Church MD  5/4/2020 7:57 PM CDT Workstation: [id number]    Xr Elbow 2 View Right (in Office)    Result Date: 5/4/2020  Narrative: PROCEDURE: XR ELBOW 2 VW RIGHT VIEWS: 2 INDICATION: Pain COMPARISON: None FINDINGS:   - fracture: None   - alignment: Within normal limits   - misc: No joint effusion or other significant soft tissue abnormality identified     Impression: Negative examination for age. Note:  if pain or symptoms persist beyond reasonable expectations and follow-up imaging is anticipated,  cross sectional imaging  (CT and/or MRI) is suggested, as is deemed clinically appropriate. Electronically signed by:  Dionne Church MD  5/4/2020 7:52 PM CDT Workstation: [id number]          ASSESSMENT:    Diagnoses and all orders for this visit:    Left shoulder pain, unspecified chronicity  -     Ambulatory Referral to Physical Therapy Evaluate and treat    Adhesive capsulitis of left shoulder    Lateral epicondylitis, right elbow          PLAN at this point we did send her to physical therapy to measure the shoulder work on this.  I talked about getting a tennis elbow strap on the right side.  He has kidney issues and therefore cannot take any anti-inflammatories.  He is seeing Dr. Malik today.  I make an appointment to see him back in 3 weeks consider intra-articular injection.  We would not do that today with her sugars being so wildly out of control.  I discussed this with him we will see him in 3 weeks.  I gone over the natural history of both of these problems with him today I will see him back in 3 weeks.    No follow-ups on file.        This document has been electronically signed by Uli Cortes MD on May 18, 2020 11:55

## 2020-05-18 NOTE — PATIENT INSTRUCTIONS

## 2020-05-18 NOTE — TELEPHONE ENCOUNTER
augusta called and left a vm. Would like a call back for clarification on medication sent over today

## 2020-05-19 ENCOUNTER — TELEPHONE (OUTPATIENT)
Dept: ENDOCRINOLOGY | Facility: CLINIC | Age: 35
End: 2020-05-19

## 2020-05-19 ENCOUNTER — TREATMENT (OUTPATIENT)
Dept: PHYSICAL THERAPY | Facility: CLINIC | Age: 35
End: 2020-05-19

## 2020-05-19 DIAGNOSIS — M75.02 ADHESIVE CAPSULITIS OF LEFT SHOULDER: Primary | ICD-10-CM

## 2020-05-19 DIAGNOSIS — E11.9 TYPE 2 DIABETES MELLITUS WITHOUT COMPLICATION, WITH LONG-TERM CURRENT USE OF INSULIN (HCC): ICD-10-CM

## 2020-05-19 DIAGNOSIS — Z79.4 TYPE 2 DIABETES MELLITUS WITHOUT COMPLICATION, WITH LONG-TERM CURRENT USE OF INSULIN (HCC): ICD-10-CM

## 2020-05-19 DIAGNOSIS — M25.512 LEFT SHOULDER PAIN, UNSPECIFIED CHRONICITY: ICD-10-CM

## 2020-05-19 LAB — C PEPTIDE SERPL-MCNC: 6.2 NG/ML (ref 1.1–4.4)

## 2020-05-19 PROCEDURE — A9300 EXERCISE EQUIPMENT: HCPCS | Performed by: PHYSICAL THERAPIST

## 2020-05-19 PROCEDURE — 97110 THERAPEUTIC EXERCISES: CPT | Performed by: PHYSICAL THERAPIST

## 2020-05-19 PROCEDURE — 97162 PT EVAL MOD COMPLEX 30 MIN: CPT | Performed by: PHYSICAL THERAPIST

## 2020-05-19 NOTE — PROGRESS NOTES
Physical Therapy Initial Evaluation and Plan of Care      Patient: Cal Badillo   : 1985  Diagnosis/ICD-10 Code:  Adhesive capsulitis of left shoulder [M75.02]  Referring practitioner: Uli Cortes MD  Date of Initial Visit: 2020  Today's Date: 2020  Patient seen for 1 sessions    Next MD appt: 2020.  Recertification: 2020           Subjective Questionnaire: QuickDASH: 84.09%      Subjective Evaluation    History of Present Illness  Onset date: ~6 month onset.    Subjective comment: patient reports he started having some soreness in the shoulder and it has steadily increased. He reports he will get sharp pains shooting through the arm. He rpeorts he has trouble raising the arm and a lot of pain and tenderness.  Patient Occupation: [employer]   Precautions and Work Restrictions: /cookPain  Current pain ratin  At best pain ratin  At worst pain rating: 10  Location: L shoulder  Quality: throbbing, sharp, tight and pulling  Relieving factors: medications  Aggravating factors: sleeping, overhead activity, lifting, movement, repetitive movement and outstretched reach    Social Support  Lives with: spouse and young children    Hand dominance: right    Diagnostic Tests  X-ray: normal    Treatments  Current treatment: medication  Patient Goals  Patient goals for therapy: decreased pain and increased motion             Objective          Static Posture     Head  Forward.    Shoulders  Rounded.    Postural Observations  Seated posture: poor  Standing posture: good        Tenderness     Left Shoulder   Tenderness in the AC joint and clavicle.     Cervical/Thoracic Screen   Cervical range of motion within normal limits    Neurological Testing     Sensation     Shoulder   Left Shoulder   Intact: light touch    Right Shoulder   Intact: light touch    Active Range of Motion   Left Shoulder   Flexion: 85 degrees   Abduction: 65 degrees   External rotation 45°:  35 degrees   Internal rotation 45°: 55 degrees     Right Shoulder   Normal active range of motion  Flexion: 170 degrees   Abduction: 175 degrees   External rotation 90°: 85 degrees  Internal rotation 90°: 55 degrees     Passive Range of Motion   Left Shoulder   Flexion: 95 degrees   Adduction: 82 degrees     Additional Passive Range of Motion Details  Active guarding with PROM.    Scapular Mobility   Left Shoulder   Scapular mobility: poor    Right Shoulder   Scapular mobility: good    Joint Play   Left Shoulder  Hypomobile in the anterior capsule, posterior capsule and inferior capsule.    Right Shoulder  Joints within functional limits are the anterior capsule, posterior capsule and inferior capsule.     Strength/Myotome Testing     Right Shoulder   Normal muscle strength    Additional Strength Details  MMT deferred on the L.    Tests     Left Shoulder   Positive Hawkin's and Neer's.     Ambulation     Comments   FWB, non-antalgic gait, no distress, no assistive device, no significant gait deviation,absent L arm swing with gait.          Assessment & Plan     Assessment  Impairments: abnormal or restricted ROM, activity intolerance, impaired physical strength, lacks appropriate home exercise program and pain with function  Assessment details: Patient has significant guarding of the L UE and significant loss of ROM. Active guarding with attempted PROM, so unsuccessful. Did well with AAROM exercises to S.  Prognosis details: Barriers to Rehab: Include significant or possible arthritic/degenerative changes that have occurred within the joint, The chronicity of this issue, The patient's obesity, secondary diagnosis of DM.    Safety Issues: None noted.    Functional Limitations: carrying objects, lifting, sleeping, pulling, pushing, uncomfortable because of pain, reaching behind back, reaching overhead and unable to perform repetitive tasks  Goals  Plan Goals: Short Term Goals:  1) I with HEP and have additions/changes  by next re-certification.    2) Patient to be more aware of posture and posture correction technique.    3) AROM L Shoulder flexion >=125°.    4) AROM L Shoulder abduction >=120°.    5) AROM L shoulder IR >= 45° at 90° of shoulder abduction.    6) AROM L shoulder ER >= 45° at 90° of shoulder abduction.    7) L Shoulder  L >= 4/5 in available range.    Long Term Goals:  1) Patient to have full PROM in all planes.    2) Patient to have full AAROM with 3-way natalia's.    3) AROM L Shoulder flexion/abduction >=160°.    4) AROM L shoulder IR >= 65° at 90° of shoulder abduction.    5) AROM L shoulder ER >= 80° at 90° of shoulder abduction.    6) B UE 5/5.    7) Patient able to perform 1 arm box carry with 10# for 3 minutes with no increase in pain.    8) Patient able to perform 3 minutes of OH activities with no increase in pain.    9) I with final HEP    10) D/C with a final HEP and free 30 day fitness [employer] membership.    Plan  Therapy options: will be seen for skilled physical therapy services  Planned modality interventions: cryotherapy and high voltage pulsed current (pain management)  Planned therapy interventions: flexibility, functional ROM exercises, home exercise program, manual therapy, neuromuscular re-education, postural training, soft tissue mobilization, joint mobilization, strengthening, stretching, therapeutic activities and spinal/joint mobilization  Duration in visits: 20  Treatment plan discussed with: patient  Plan details: Progress overall ROM, strength, scap stab, posture, function, through manual therapy, stretching mobilizations and other there ex.       Other therapeutic activities and/or exercises will be prescribed depending on the patients progress or lack there of.    Visit Diagnoses:    ICD-10-CM ICD-9-CM   1. Adhesive capsulitis of left shoulder M75.02 726.0   2. Left shoulder pain, unspecified chronicity M25.512 719.41   3. Type 2 diabetes mellitus without complication, with long-term  current use of insulin (CMS/[facility]) E11.9 250.00    Z79.4 V58.67       Timed:  Manual Therapy:         mins  13933;  Therapeutic Exercise:    20     mins  76935;     Neuromuscular Lesli:        mins  42158;    Therapeutic Activity:          mins  70393;     Gait Training:           mins  29666;     Ultrasound:          mins  28022;    Electrical Stimulation:         mins  46548 ( );    Untimed:  Electrical Stimulation:         mins  77974 ( );  Mechanical Traction:         mins  64209;     Timed Treatment:   20   mins   Total Treatment:     40   mins    PT SIGNATURE: Almaz Dean, PT DPT, [facility]   DATE TREATMENT INITIATED: 5/19/2020    Initial Certification  Certification Period: 8/17/2020  I certify that the therapy services are furnished while this patient is under my care.  The services outlined above are required by this patient, and will be reviewed every 90 days.     PHYSICIAN: Uli Cortes MD      DATE:     Please sign and return via fax to  .. Thank you, [facility].

## 2020-05-20 ENCOUNTER — TELEPHONE (OUTPATIENT)
Dept: ENDOCRINOLOGY | Facility: CLINIC | Age: 35
End: 2020-05-20

## 2020-05-21 ENCOUNTER — TELEPHONE (OUTPATIENT)
Dept: PHYSICAL THERAPY | Facility: CLINIC | Age: 35
End: 2020-05-21

## 2020-05-21 DIAGNOSIS — Z79.4 TYPE 2 DIABETES MELLITUS WITHOUT COMPLICATION, WITH LONG-TERM CURRENT USE OF INSULIN (HCC): ICD-10-CM

## 2020-05-21 DIAGNOSIS — M25.512 LEFT SHOULDER PAIN, UNSPECIFIED CHRONICITY: ICD-10-CM

## 2020-05-21 DIAGNOSIS — E11.9 TYPE 2 DIABETES MELLITUS WITHOUT COMPLICATION, WITH LONG-TERM CURRENT USE OF INSULIN (HCC): ICD-10-CM

## 2020-05-21 DIAGNOSIS — M75.02 ADHESIVE CAPSULITIS OF LEFT SHOULDER: Primary | ICD-10-CM

## 2020-05-22 ENCOUNTER — TELEPHONE (OUTPATIENT)
Dept: ENDOCRINOLOGY | Facility: CLINIC | Age: 35
End: 2020-05-22

## 2020-05-22 NOTE — TELEPHONE ENCOUNTER
He just got Gabapentin filled from his PCP on May 5. Insurance will not cover another RX 10 days later. He will have to decide who he is going to get it from and also wait till June 5 before insurance will allow a refill. Again NO PA required, he tried to fill both RX.  Chucky # [id number]

## 2020-05-26 ENCOUNTER — TELEPHONE (OUTPATIENT)
Dept: PHYSICAL THERAPY | Facility: CLINIC | Age: 35
End: 2020-05-26

## 2020-05-26 DIAGNOSIS — Z79.4 TYPE 2 DIABETES MELLITUS WITHOUT COMPLICATION, WITH LONG-TERM CURRENT USE OF INSULIN (HCC): ICD-10-CM

## 2020-05-26 DIAGNOSIS — E11.9 TYPE 2 DIABETES MELLITUS WITHOUT COMPLICATION, WITH LONG-TERM CURRENT USE OF INSULIN (HCC): ICD-10-CM

## 2020-05-26 DIAGNOSIS — M75.02 ADHESIVE CAPSULITIS OF LEFT SHOULDER: Primary | ICD-10-CM

## 2020-05-26 DIAGNOSIS — M25.512 LEFT SHOULDER PAIN, UNSPECIFIED CHRONICITY: ICD-10-CM

## 2020-05-28 ENCOUNTER — TELEPHONE (OUTPATIENT)
Dept: PHYSICAL THERAPY | Facility: CLINIC | Age: 35
End: 2020-05-28

## 2020-05-28 DIAGNOSIS — M75.02 ADHESIVE CAPSULITIS OF LEFT SHOULDER: Primary | ICD-10-CM

## 2020-05-28 DIAGNOSIS — M25.512 LEFT SHOULDER PAIN, UNSPECIFIED CHRONICITY: ICD-10-CM

## 2020-05-28 DIAGNOSIS — Z79.4 TYPE 2 DIABETES MELLITUS WITHOUT COMPLICATION, WITH LONG-TERM CURRENT USE OF INSULIN (HCC): ICD-10-CM

## 2020-05-28 DIAGNOSIS — E11.9 TYPE 2 DIABETES MELLITUS WITHOUT COMPLICATION, WITH LONG-TERM CURRENT USE OF INSULIN (HCC): ICD-10-CM

## 2020-05-28 NOTE — TELEPHONE ENCOUNTER
Called for reminder phone call yesterday afternoon. Spoke with patient and he confirmed he was coming.

## 2020-05-31 DIAGNOSIS — G47.00 INSOMNIA, UNSPECIFIED TYPE: ICD-10-CM

## 2020-06-01 RX ORDER — TRAZODONE HYDROCHLORIDE 50 MG/1
TABLET ORAL
Qty: 30 TABLET | Refills: 5 | Status: SHIPPED | OUTPATIENT
Start: 2020-06-01 | End: 2020-12-23 | Stop reason: SDUPTHER

## 2020-06-04 ENCOUNTER — TELEPHONE (OUTPATIENT)
Dept: ENDOCRINOLOGY | Facility: CLINIC | Age: 35
End: 2020-06-04

## 2020-06-25 DIAGNOSIS — E11.42 POLYNEUROPATHY DUE TO TYPE 2 DIABETES MELLITUS (HCC): ICD-10-CM

## 2020-06-27 RX ORDER — GABAPENTIN 600 MG/1
TABLET ORAL
Qty: 135 TABLET | Refills: 0 | Status: SHIPPED | OUTPATIENT
Start: 2020-06-27 | End: 2020-12-30 | Stop reason: SDUPTHER

## 2020-08-08 DIAGNOSIS — G25.81 RESTLESS LEG SYNDROME: ICD-10-CM

## 2020-08-10 RX ORDER — ROPINIROLE 0.5 MG/1
TABLET, FILM COATED ORAL
Qty: 30 TABLET | Refills: 4 | Status: SHIPPED | OUTPATIENT
Start: 2020-08-10 | End: 2020-12-23 | Stop reason: SDUPTHER

## 2020-08-25 ENCOUNTER — TELEPHONE (OUTPATIENT)
Dept: FAMILY MEDICINE CLINIC | Facility: CLINIC | Age: 35
End: 2020-08-25

## 2020-08-25 NOTE — TELEPHONE ENCOUNTER
PATIENT NEEDS FREESTYLE TEST STRIPES FOR HIS OMNIPOD THAT HE JUST RECEIVED. PATIENT IS REQUESTING A CALL  BACK TO DISCUSS THIS

## 2020-10-28 ENCOUNTER — TELEPHONE (OUTPATIENT)
Dept: ENDOCRINOLOGY | Facility: CLINIC | Age: 35
End: 2020-10-28

## 2020-11-22 DIAGNOSIS — G25.81 RESTLESS LEG SYNDROME: ICD-10-CM

## 2020-11-23 RX ORDER — ROPINIROLE 0.5 MG/1
TABLET, FILM COATED ORAL
Qty: 60 TABLET | Refills: 3 | OUTPATIENT
Start: 2020-11-23

## 2020-12-11 DIAGNOSIS — G25.81 RESTLESS LEG SYNDROME: ICD-10-CM

## 2020-12-14 RX ORDER — ROPINIROLE 0.5 MG/1
TABLET, FILM COATED ORAL
Qty: 60 TABLET | Refills: 3 | OUTPATIENT
Start: 2020-12-14

## 2020-12-23 ENCOUNTER — APPOINTMENT (OUTPATIENT)
Dept: CT IMAGING | Facility: HOSPITAL | Age: 35
End: 2020-12-23

## 2020-12-23 ENCOUNTER — OFFICE VISIT (OUTPATIENT)
Dept: FAMILY MEDICINE CLINIC | Facility: CLINIC | Age: 35
End: 2020-12-23

## 2020-12-23 ENCOUNTER — HOSPITAL ENCOUNTER (EMERGENCY)
Facility: HOSPITAL | Age: 35
Discharge: HOME | End: 2020-12-23
Attending: EMERGENCY MEDICINE | Admitting: EMERGENCY MEDICINE

## 2020-12-23 VITALS
SYSTOLIC BLOOD PRESSURE: 142 MMHG | TEMPERATURE: 98 F | HEIGHT: 72 IN | WEIGHT: 218.4 LBS | DIASTOLIC BLOOD PRESSURE: 90 MMHG | HEART RATE: 97 BPM | BODY MASS INDEX: 29.58 KG/M2 | OXYGEN SATURATION: 99 %

## 2020-12-23 VITALS
SYSTOLIC BLOOD PRESSURE: 142 MMHG | DIASTOLIC BLOOD PRESSURE: 97 MMHG | OXYGEN SATURATION: 95 % | HEIGHT: 72 IN | RESPIRATION RATE: 18 BRPM | HEART RATE: 84 BPM | WEIGHT: 218 LBS | BODY MASS INDEX: 29.53 KG/M2 | TEMPERATURE: 97.5 F

## 2020-12-23 DIAGNOSIS — G25.81 RESTLESS LEG SYNDROME: ICD-10-CM

## 2020-12-23 DIAGNOSIS — E11.9 TYPE 2 DIABETES MELLITUS WITHOUT COMPLICATION, WITH LONG-TERM CURRENT USE OF INSULIN (HCC): ICD-10-CM

## 2020-12-23 DIAGNOSIS — Z79.4 TYPE 2 DIABETES MELLITUS WITHOUT COMPLICATION, WITH LONG-TERM CURRENT USE OF INSULIN (HCC): ICD-10-CM

## 2020-12-23 DIAGNOSIS — R10.13 EPIGASTRIC PAIN: ICD-10-CM

## 2020-12-23 DIAGNOSIS — K29.00 ACUTE GASTRITIS, PRESENCE OF BLEEDING UNSPECIFIED, UNSPECIFIED GASTRITIS TYPE: Primary | ICD-10-CM

## 2020-12-23 DIAGNOSIS — R10.13 EPIGASTRIC PAIN: Primary | ICD-10-CM

## 2020-12-23 DIAGNOSIS — R10.9 BILATERAL FLANK PAIN: ICD-10-CM

## 2020-12-23 DIAGNOSIS — G47.00 INSOMNIA, UNSPECIFIED TYPE: ICD-10-CM

## 2020-12-23 LAB
ACETONE BLD QL: NEGATIVE
ALBUMIN SERPL-MCNC: 4.2 G/DL (ref 3.5–5.2)
ALBUMIN/GLOB SERPL: 1.3 G/DL
ALP SERPL-CCNC: 103 U/L (ref 39–117)
ALT SERPL W P-5'-P-CCNC: 129 U/L (ref 1–41)
ANION GAP SERPL CALCULATED.3IONS-SCNC: 9 MMOL/L (ref 5–15)
AST SERPL-CCNC: 27 U/L (ref 1–40)
BASOPHILS # BLD AUTO: 0.09 10*3/MM3 (ref 0–0.2)
BASOPHILS NFR BLD AUTO: 1 % (ref 0–1.5)
BILIRUB SERPL-MCNC: 0.2 MG/DL (ref 0–1.2)
BILIRUB UR QL STRIP: NEGATIVE
BUN SERPL-MCNC: 15 MG/DL (ref 6–20)
BUN/CREAT SERPL: 16.7 (ref 7–25)
CALCIUM SPEC-SCNC: 10.6 MG/DL (ref 8.6–10.5)
CHLORIDE SERPL-SCNC: 97 MMOL/L (ref 98–107)
CLARITY UR: CLEAR
CO2 SERPL-SCNC: 26 MMOL/L (ref 22–29)
COLOR UR: YELLOW
CREAT SERPL-MCNC: 0.9 MG/DL (ref 0.76–1.27)
DEPRECATED RDW RBC AUTO: 35.5 FL (ref 37–54)
EOSINOPHIL # BLD AUTO: 0.13 10*3/MM3 (ref 0–0.4)
EOSINOPHIL NFR BLD AUTO: 1.4 % (ref 0.3–6.2)
ERYTHROCYTE [DISTWIDTH] IN BLOOD BY AUTOMATED COUNT: 11.7 % (ref 12.3–15.4)
EXPIRATION DATE: ABNORMAL
GFR SERPL CREATININE-BSD FRML MDRD: 95 ML/MIN/1.73
GLOBULIN UR ELPH-MCNC: 3.3 GM/DL
GLUCOSE BLDC GLUCOMTR-MCNC: 359 MG/DL (ref 70–130)
GLUCOSE SERPL-MCNC: 434 MG/DL (ref 65–99)
GLUCOSE UR STRIP-MCNC: ABNORMAL MG/DL
HBA1C MFR BLD: 13.9 %
HCT VFR BLD AUTO: 43.5 % (ref 37.5–51)
HGB BLD-MCNC: 15.6 G/DL (ref 13–17.7)
HGB UR QL STRIP.AUTO: NEGATIVE
HOLD SPECIMEN: NORMAL
HOLD SPECIMEN: NORMAL
IMM GRANULOCYTES # BLD AUTO: 0.04 10*3/MM3 (ref 0–0.05)
IMM GRANULOCYTES NFR BLD AUTO: 0.4 % (ref 0–0.5)
KETONES UR QL STRIP: NEGATIVE
LEUKOCYTE ESTERASE UR QL STRIP.AUTO: NEGATIVE
LIPASE SERPL-CCNC: 38 U/L (ref 13–60)
LYMPHOCYTES # BLD AUTO: 3.47 10*3/MM3 (ref 0.7–3.1)
LYMPHOCYTES NFR BLD AUTO: 37.3 % (ref 19.6–45.3)
Lab: ABNORMAL
MCH RBC QN AUTO: 30.2 PG (ref 26.6–33)
MCHC RBC AUTO-ENTMCNC: 35.9 G/DL (ref 31.5–35.7)
MCV RBC AUTO: 84.3 FL (ref 79–97)
MONOCYTES # BLD AUTO: 0.67 10*3/MM3 (ref 0.1–0.9)
MONOCYTES NFR BLD AUTO: 7.2 % (ref 5–12)
NEUTROPHILS NFR BLD AUTO: 4.91 10*3/MM3 (ref 1.7–7)
NEUTROPHILS NFR BLD AUTO: 52.7 % (ref 42.7–76)
NITRITE UR QL STRIP: NEGATIVE
NRBC BLD AUTO-RTO: 0 /100 WBC (ref 0–0.2)
PH UR STRIP.AUTO: 5.5 [PH] (ref 5–9)
PLATELET # BLD AUTO: 217 10*3/MM3 (ref 140–450)
PMV BLD AUTO: 11 FL (ref 6–12)
POTASSIUM SERPL-SCNC: 3.9 MMOL/L (ref 3.5–5.2)
PROT SERPL-MCNC: 7.5 G/DL (ref 6–8.5)
PROT UR QL STRIP: NEGATIVE
RBC # BLD AUTO: 5.16 10*6/MM3 (ref 4.14–5.8)
SODIUM SERPL-SCNC: 132 MMOL/L (ref 136–145)
SP GR UR STRIP: 1.04 (ref 1–1.03)
UROBILINOGEN UR QL STRIP: ABNORMAL
WBC # BLD AUTO: 9.31 10*3/MM3 (ref 3.4–10.8)
WHOLE BLOOD HOLD SPECIMEN: NORMAL
WHOLE BLOOD HOLD SPECIMEN: NORMAL

## 2020-12-23 PROCEDURE — 81003 URINALYSIS AUTO W/O SCOPE: CPT

## 2020-12-23 PROCEDURE — 74177 CT ABD & PELVIS W/CONTRAST: CPT

## 2020-12-23 PROCEDURE — 82009 KETONE BODYS QUAL: CPT | Performed by: EMERGENCY MEDICINE

## 2020-12-23 PROCEDURE — 25010000002 HYDROMORPHONE 1 MG/ML SOLUTION: Performed by: EMERGENCY MEDICINE

## 2020-12-23 PROCEDURE — 99283 EMERGENCY DEPT VISIT LOW MDM: CPT

## 2020-12-23 PROCEDURE — 99214 OFFICE O/P EST MOD 30 MIN: CPT | Performed by: NURSE PRACTITIONER

## 2020-12-23 PROCEDURE — 85025 COMPLETE CBC W/AUTO DIFF WBC: CPT

## 2020-12-23 PROCEDURE — 83036 HEMOGLOBIN GLYCOSYLATED A1C: CPT | Performed by: NURSE PRACTITIONER

## 2020-12-23 PROCEDURE — 83690 ASSAY OF LIPASE: CPT | Performed by: EMERGENCY MEDICINE

## 2020-12-23 PROCEDURE — 96374 THER/PROPH/DIAG INJ IV PUSH: CPT

## 2020-12-23 PROCEDURE — 25010000002 IOPAMIDOL 61 % SOLUTION: Performed by: EMERGENCY MEDICINE

## 2020-12-23 PROCEDURE — 25010000002 MORPHINE PER 10 MG: Performed by: EMERGENCY MEDICINE

## 2020-12-23 PROCEDURE — 80053 COMPREHEN METABOLIC PANEL: CPT | Performed by: EMERGENCY MEDICINE

## 2020-12-23 PROCEDURE — 25010000002 ONDANSETRON PER 1 MG: Performed by: EMERGENCY MEDICINE

## 2020-12-23 PROCEDURE — 96375 TX/PRO/DX INJ NEW DRUG ADDON: CPT

## 2020-12-23 PROCEDURE — 82962 GLUCOSE BLOOD TEST: CPT | Performed by: NURSE PRACTITIONER

## 2020-12-23 RX ORDER — ROPINIROLE 0.5 MG/1
0.5 TABLET, FILM COATED ORAL NIGHTLY
Qty: 30 TABLET | Refills: 5 | Status: SHIPPED | OUTPATIENT
Start: 2020-12-23 | End: 2020-12-30

## 2020-12-23 RX ORDER — LISINOPRIL 10 MG/1
10 TABLET ORAL DAILY
Qty: 30 TABLET | Refills: 5 | Status: SHIPPED | OUTPATIENT
Start: 2020-12-23 | End: 2021-02-09

## 2020-12-23 RX ORDER — SODIUM CHLORIDE 0.9 % (FLUSH) 0.9 %
10 SYRINGE (ML) INJECTION AS NEEDED
Status: DISCONTINUED | OUTPATIENT
Start: 2020-12-23 | End: 2020-12-24 | Stop reason: HOSPADM

## 2020-12-23 RX ORDER — SUCRALFATE 1 G/1
1 TABLET ORAL 4 TIMES DAILY
Qty: 28 TABLET | Refills: 0 | Status: SHIPPED | OUTPATIENT
Start: 2020-12-23 | End: 2020-12-30

## 2020-12-23 RX ORDER — TRAZODONE HYDROCHLORIDE 50 MG/1
50 TABLET ORAL NIGHTLY
Qty: 30 TABLET | Refills: 5 | Status: SHIPPED | OUTPATIENT
Start: 2020-12-23

## 2020-12-23 RX ORDER — LIDOCAINE HYDROCHLORIDE 20 MG/ML
15 SOLUTION OROPHARYNGEAL ONCE
Status: COMPLETED | OUTPATIENT
Start: 2020-12-23 | End: 2020-12-23

## 2020-12-23 RX ORDER — ONDANSETRON 2 MG/ML
4 INJECTION INTRAMUSCULAR; INTRAVENOUS ONCE
Status: COMPLETED | OUTPATIENT
Start: 2020-12-23 | End: 2020-12-23

## 2020-12-23 RX ORDER — DICYCLOMINE HCL 20 MG
TABLET ORAL
COMMUNITY
Start: 2020-12-04

## 2020-12-23 RX ORDER — ALUMINA, MAGNESIA, AND SIMETHICONE 2400; 2400; 240 MG/30ML; MG/30ML; MG/30ML
15 SUSPENSION ORAL ONCE
Status: COMPLETED | OUTPATIENT
Start: 2020-12-23 | End: 2020-12-23

## 2020-12-23 RX ADMIN — ALUMINUM HYDROXIDE, MAGNESIUM HYDROXIDE, AND DIMETHICONE 15 ML: 400; 400; 40 SUSPENSION ORAL at 23:00

## 2020-12-23 RX ADMIN — IOPAMIDOL 90 ML: 612 INJECTION, SOLUTION INTRAVENOUS at 22:13

## 2020-12-23 RX ADMIN — LIDOCAINE HYDROCHLORIDE 15 ML: 20 SOLUTION ORAL; TOPICAL at 23:00

## 2020-12-23 RX ADMIN — ONDANSETRON HYDROCHLORIDE 4 MG: 2 INJECTION, SOLUTION INTRAMUSCULAR; INTRAVENOUS at 20:37

## 2020-12-23 RX ADMIN — MORPHINE SULFATE 4 MG: 4 INJECTION INTRAVENOUS at 20:37

## 2020-12-23 RX ADMIN — SODIUM CHLORIDE 1000 ML: 900 INJECTION, SOLUTION INTRAVENOUS at 20:37

## 2020-12-23 RX ADMIN — HYDROMORPHONE HYDROCHLORIDE 0.5 MG: 1 INJECTION, SOLUTION INTRAMUSCULAR; INTRAVENOUS; SUBCUTANEOUS at 21:23

## 2020-12-23 NOTE — PROGRESS NOTES
"Chief Complaint   Patient presents with   • Abdominal Pain   • Back Pain       Subjective:  Cal Badillo is a 35 y.o. male who presents for c/o epigastric pain that radiates around to sides and back x 1 week. Reports going to [facility] ER on 12/17/2020 and returned on 12/19/2020 for same sx and per pt was diagnosed with pancreatitis but was not hospitalized d/t it \"not being bad enough.\" Reports he was given 3 days of pain medication and sent home. Pt reports sx have worsened - states he is unable to eat - reports BS are high and he is out of his dexcom inserts - review of appts show pt has no showed several times for Dr. Barriga appts. Pt reports he was told at ER visit that he also has elevated liver enzymes/fatty liver. Also requesting refill of some of his chronic medications.       The following portions of the patient's history were reviewed and updated as appropriate: allergies, current medications, past family history, past medical history, past social history, past surgical history and problem list.    Abdominal Pain  This is a new problem. The current episode started in the past 7 days. The problem occurs constantly. The problem has been gradually worsening. The pain is located in the epigastric region. The pain is at a severity of 10/10. The pain is severe. The quality of the pain is aching. The abdominal pain radiates to the right flank, left flank and back. Associated symptoms include anorexia, diarrhea and nausea. Pertinent negatives include no constipation, dysuria or fever. The pain is relieved by nothing. He has tried oral narcotic analgesics for the symptoms. The treatment provided moderate relief. His past medical history is significant for pancreatitis (recently dx at ER - no ER records to confirm).   Back Pain  This is a new problem. The current episode started in the past 7 days. The problem occurs constantly. The problem has been gradually worsening since onset. The pain is present in the thoracic " spine. The pain is at a severity of 10/10. The pain is severe. The pain is the same all the time. Associated symptoms include abdominal pain. Pertinent negatives include no bladder incontinence, bowel incontinence, chest pain, dysuria or fever.        Past Medical History:   Diagnosis Date   • Allergic    • Anxiety    • Arthritis    • Chronic diarrhea    • Chronic kidney disease    • COPD (chronic obstructive pulmonary disease) (CMS/[facility])    • Diabetes mellitus (CMS/[facility])    • Diabetic polyneuropathy (CMS/[facility])    • Headache    • Injury of back    • Injury of neck    • Lumbar radiculopathy, chronic 2017   • Sleep apnea          Current Outpatient Medications:   •  Alcohol Swabs pads, Use swab as needed to clean insulin vial and to clean skin before injection or finger stick., Disp: 120 each, Rfl: 5  •  Blood Glucose Monitoring Suppl (ONE TOUCH ULTRA 2) w/Device kit, , Disp: , Rfl:   •  dicyclomine (BENTYL) 20 MG tablet, , Disp: , Rfl:   •  gabapentin (NEURONTIN) 600 MG tablet, TAKE 1 TABLET BY MOUTH FIVE TIMES DAILY, Disp: 135 tablet, Rfl: 0  •  glucose blood (ONE TOUCH ULTRA TEST) test strip, Check blood sugar 2 times daily dx e11.9, Disp: 100 each, Rfl: 12  •  glucose monitor monitoring kit, 1 each Take As Directed. Check BS 1 hr before meals, 2 hrs after meals and at bedtime, Disp: 1 each, Rfl: 0  •  Insulin Pen Needle (PEN NEEDLES) 31G X 8 MM misc, 1 each Daily., Disp: 30 each, Rfl: 11  •  Insulin Syringes, Disposable, U-100 0.3 ML misc, 1 syringe As Needed (use with each Humulin R dose administration)., Disp: 100 each, Rfl: 5  •  Lancets (ONETOUCH ULTRASOFT) lancets, Use to obtain blood sample to test blood sugar at least 3 times daily, Disp: 200 each, Rfl: 5  •  lisinopril (PRINIVIL,ZESTRIL) 10 MG tablet, Take 1 tablet by mouth Daily., Disp: 30 tablet, Rfl: 5  •  nortriptyline (Pamelor) 10 MG capsule, Take 1 capsule by mouth Every Night., Disp: 30 capsule, Rfl: 5  •  rOPINIRole (REQUIP) 0.5 MG tablet, Take 1  "tablet by mouth Every Night. Take 1 hour before bedtime., Disp: 30 tablet, Rfl: 5  •  traZODone (DESYREL) 50 MG tablet, Take 1 tablet by mouth Every Night., Disp: 30 tablet, Rfl: 5  •  TRUEPLUS INSULIN SYRINGE 31G X 5/16\" 0.3 ML misc, , Disp: , Rfl:   •  atorvastatin (LIPITOR) 10 MG tablet, Take 1 tablet by mouth Daily., Disp: 30 tablet, Rfl: 5  •  Insulin Glargine (BASAGLAR KWIKPEN) 100 UNIT/ML injection pen, Inject 20 Units under the skin into the appropriate area as directed Every Night., Disp: 3 mL, Rfl: 5  •  insulin lispro (Admelog) 100 UNIT/ML injection, Up to 150 units daily through pump, Disp: 50 mL, Rfl: 11  •  metFORMIN (Glucophage) 1000 MG tablet, Take 1 tablet by mouth 2 (Two) Times a Day With Meals., Disp: 30 tablet, Rfl: 5    Review of Systems    Review of Systems   Constitutional: Positive for appetite change (decreased) and diaphoresis. Negative for fever.   Respiratory: Negative for cough, chest tightness, shortness of breath and wheezing.    Cardiovascular: Negative for chest pain, palpitations and leg swelling.   Gastrointestinal: Positive for abdominal pain, anorexia, diarrhea and nausea. Negative for bowel incontinence and constipation.   Genitourinary: Negative for bladder incontinence and dysuria.   Musculoskeletal: Positive for back pain.   Skin: Negative for rash.       Objective  Vitals:    12/23/20 0933   BP: 142/90   Pulse: 97   Temp: 98 °F (36.7 °C)   SpO2: 99%   Weight: 99.1 kg (218 lb 6.4 oz)   Height: 182.9 cm (72\")   PainSc: 10-Worst pain ever       Physical Exam  Vitals signs and nursing note reviewed.   Constitutional:       General: He is in acute distress.      Appearance: Normal appearance. He is normal weight. He is not ill-appearing or diaphoretic.      Comments: Grimacing with hand clutching epigastric area   HENT:      Head: Normocephalic and atraumatic.   Eyes:      Conjunctiva/sclera: Conjunctivae normal.      Pupils: Pupils are equal, round, and reactive to light. "   Neck:      Musculoskeletal: Normal range of motion.   Cardiovascular:      Rate and Rhythm: Normal rate and regular rhythm.      Heart sounds: Normal heart sounds.   Pulmonary:      Effort: Pulmonary effort is normal.      Breath sounds: Normal breath sounds.   Abdominal:      Tenderness: There is abdominal tenderness. There is right CVA tenderness and left CVA tenderness.   Musculoskeletal: Normal range of motion.   Skin:     General: Skin is warm and dry.   Neurological:      General: No focal deficit present.      Mental Status: He is alert and oriented to person, place, and time.   Psychiatric:         Mood and Affect: Mood normal.         Behavior: Behavior normal.         Recent Results (from the past 672 hour(s))   POCT Glucose    Collection Time: 12/23/20  9:47 AM    Specimen: Blood   Result Value Ref Range    Glucose 359 (A) 70 - 130 mg/dL       No Images in the past 120 days found.    Diagnoses and all orders for this visit:    1. Epigastric pain (Primary)    2. Bilateral flank pain    3. Type 2 diabetes mellitus without complication, with long-term current use of insulin (CMS/[facility])  -     POCT Glucose  -     POCT glycated hemoglobin, total  -     lisinopril (PRINIVIL,ZESTRIL) 10 MG tablet; Take 1 tablet by mouth Daily.  Dispense: 30 tablet; Refill: 5    4. Restless leg syndrome  -     rOPINIRole (REQUIP) 0.5 MG tablet; Take 1 tablet by mouth Every Night. Take 1 hour before bedtime.  Dispense: 30 tablet; Refill: 5    5. Insomnia, unspecified type  -     traZODone (DESYREL) 50 MG tablet; Take 1 tablet by mouth Every Night.  Dispense: 30 tablet; Refill: 5    - Based on presenting symptoms and exam pt strongly advised to return to ER for further evaluation. Pt v/u.  - No ER records available for review - request submitted.  - POCT glucose and A1c obtained in office and results discussed and additional cause of concern for pancreatitis and reason to go to ER.  - Refills submitted for lisinopril 10mg daily for  cardioprotection d/t DM; ropinirole 0.5mg at HS for RLS; trazodone 50 mg at HS for insomnia.  - Informed pt to not take nortriptyline and trazodone together.   - RTC 1 mo for Annual Exam or PRN      This document has been electronically signed by KAYLAN Kendall on December 23, 2020 16:47 CST

## 2020-12-24 NOTE — ED PROVIDER NOTES
Subjective   35-year-old male presents to emergency department complaint of epigastric abdominal pain, vomiting and diarrhea and decreased oral intake for greater than 1 week.  Reports that he was seen at [facility] approximately 10 days ago when reportedly diagnosed with pancreatitis was sent home with instructions to manage with clear liquids and bland diet and a few days worth of pain medication.  Patient is a noncompliant poorly controlled diabetic but denies history of issues with her pancreas.  History of cholecystectomy and appendectomy.  Denies fevers or chills.  Size primary care provider today who recommended if no improvement he should be reevaluated in the emergency department.  He presents here with worsening pain.    Family history, surgical history, social history, current medications and allergies are reviewed with the patient and triage documentation and vitals are reviewed.      History provided by:  Patient and medical records (Records from [facility] not available at time of evaluation)   used: No        Review of Systems   Constitutional: Negative for chills and fever.   HENT: Negative for congestion and sore throat.    Eyes: Negative for photophobia and visual disturbance.   Respiratory: Negative for cough, shortness of breath and wheezing.    Cardiovascular: Negative for chest pain, palpitations and leg swelling.   Gastrointestinal: Positive for abdominal pain, diarrhea, nausea and vomiting. Negative for abdominal distention, blood in stool and constipation.   Endocrine: Negative for polydipsia, polyphagia and polyuria.   Genitourinary: Negative for decreased urine volume, dysuria, frequency and urgency.   Musculoskeletal: Negative for arthralgias, back pain, myalgias and neck pain.   Skin: Negative for color change, pallor, rash and wound.   Allergic/Immunologic: Negative.    Neurological: Negative for weakness, light-headedness and numbness.   Hematological:  Negative.    Psychiatric/Behavioral: Negative.        Past Medical History:   Diagnosis Date   • Allergic    • Anxiety    • Arthritis    • Chronic diarrhea    • Chronic kidney disease    • COPD (chronic obstructive pulmonary disease) (CMS/HCC)    • Diabetes mellitus (CMS/HCC)    • Diabetic polyneuropathy (CMS/HCC)    • Headache    • Injury of back    • Injury of neck    • Lumbar radiculopathy, chronic 2017   • Sleep apnea        Allergies   Allergen Reactions   • Tramadol Other (See Comments)     Mood Swings       Past Surgical History:   Procedure Laterality Date   • APPENDECTOMY     • CUBITAL TUNNEL RELEASE Left    • GALLBLADDER SURGERY         Family History   Problem Relation Age of Onset   • Hypertension Other    • Diabetes Other        Social History     Socioeconomic History   • Marital status:      Spouse name: Not on file   • Number of children: Not on file   • Years of education: Not on file   • Highest education level: Not on file   Tobacco Use   • Smoking status: Never Smoker   • Smokeless tobacco: Never Used   Substance and Sexual Activity   • Alcohol use: Never     Frequency: Never   • Drug use: Never   • Sexual activity: Yes     Partners: Female           Objective   Physical Exam  Vitals signs and nursing note reviewed.   Constitutional:       General: He is not in acute distress.     Appearance: He is well-developed. He is not ill-appearing, toxic-appearing or diaphoretic.   HENT:      Head: Normocephalic.      Mouth/Throat:      Mouth: Mucous membranes are moist.   Eyes:      General: No scleral icterus.     Pupils: Pupils are equal, round, and reactive to light.   Cardiovascular:      Rate and Rhythm: Normal rate and regular rhythm.      Heart sounds: Normal heart sounds. No murmur.   Pulmonary:      Effort: Pulmonary effort is normal. No respiratory distress.      Breath sounds: Normal breath sounds. No wheezing or rhonchi.   Abdominal:      General: Abdomen is flat. Bowel sounds are  normal. There is no distension.      Palpations: Abdomen is soft. There is no hepatomegaly or splenomegaly.      Tenderness: There is generalized abdominal tenderness (non-tender to palpation while ausculatating but stiffening of abdomen clenching of fist and bilateral leg shaking with very light palpation. ). There is no guarding or rebound. Negative signs include Adams's sign and McBurney's sign.      Hernia: No hernia is present.   Skin:     General: Skin is warm and dry.      Capillary Refill: Capillary refill takes less than 2 seconds.      Coloration: Skin is not jaundiced or pale.   Neurological:      General: No focal deficit present.      Mental Status: He is alert and oriented to person, place, and time.   Psychiatric:         Mood and Affect: Mood is anxious.         Behavior: Behavior normal.         Procedures  none         ED Course      Labs Reviewed   COMPREHENSIVE METABOLIC PANEL - Abnormal; Notable for the following components:       Result Value    Glucose 434 (*)     Sodium 132 (*)     Chloride 97 (*)     Calcium 10.6 (*)     ALT (SGPT) 129 (*)     All other components within normal limits    Narrative:     GFR Normal >60  Chronic Kidney Disease <60  Kidney Failure <15     URINALYSIS W/ MICROSCOPIC IF INDICATED (NO CULTURE) - Abnormal; Notable for the following components:    Specific Gravity, UA 1.038 (*)     Glucose, UA >=1000 mg/dL (3+) (*)     All other components within normal limits    Narrative:     Urine microscopic not indicated.   CBC WITH AUTO DIFFERENTIAL - Abnormal; Notable for the following components:    MCHC 35.9 (*)     RDW 11.7 (*)     RDW-SD 35.5 (*)     Lymphocytes, Absolute 3.47 (*)     All other components within normal limits   LIPASE - Normal   ACETONE - Normal   RAINBOW DRAW    Narrative:     The following orders were created for panel order [place] Draw.  Procedure                               Abnormality         Status                     ---------                                -----------         ------                     Light Blue Top[[id number]]                                   Final result               Green Top (Gel)[[id number]]                                  Final result               Lavender Top[[id number]]                                     Final result               Gold Top - SST[[id number]]                                   Final result                 Please view results for these tests on the individual orders.   CBC AND DIFFERENTIAL    Narrative:     The following orders were created for panel order CBC & Differential.  Procedure                               Abnormality         Status                     ---------                               -----------         ------                     CBC Auto Differential[[id number]]        Abnormal            Final result                 Please view results for these tests on the individual orders.   LIGHT BLUE TOP   GREEN TOP   LAVENDER TOP   GOLD TOP - SST   KETONE BODIES SERUM    Narrative:     The following orders were created for panel order Ketone Bodies, Serum (Not performed at [place]).  Procedure                               Abnormality         Status                     ---------                               -----------         ------                     Acetone[[id number]]                      Normal              Final result                 Please view results for these tests on the individual orders.     Ct Abdomen Pelvis With Contrast    Result Date: 12/23/2020  Narrative: CT SCANS ABDOMEN AND PELVIS WITH IV CONTRAST. HISTORY: epigastric pain, vomiting COMPARISON: None. TECHNIQUE: Radiation dose reduction techniques were utilized, including automated exposure control and exposure modulation based on body size. Axial images were obtained from the lung bases to the symphysis pubis with IV contrast only.. Oral contrast was not administered per request. FINDINGS :  Mild fatty liver. There are a few small  nonobstructing left renal calculi. Prior cholecystectomy and probable appendectomy. Remaining abdominal organs have an unremarkable appearance. Normal aorta. The GI tract not opacified for assessment but non obstructive in appearance.     Impression: IMPRESSION : 1. Left nephrolithiasis, nonobstructing. 2. Mild fatty liver, no acute inflammatory process. Electronically signed by:  Manuel Medina MD  12/23/2020 10:45 PM CST Workstation: [id number]          MDM  Number of Diagnoses or Management Options  Acute gastritis, presence of bleeding unspecified, unspecified gastritis type:   Epigastric pain:      Amount and/or Complexity of Data Reviewed  Clinical lab tests: reviewed  Tests in the radiology section of CPT®: reviewed    Patient Progress  Patient progress: stable    Patient reports some improvement with medications in the emergency department but still reports discomfort.  CT imaging reveals no evidence of pancreatitis and lipase is unremarkable.  Patient with hyperglycemia just over 400 with expected mild pseudohyponatremia. Patient feels more improvement with GI cocktail. Rx for Carafate and advised on follow-up with PCP for possible further testing of possible esophageal cause or gastroparesis related to DM.    Final diagnoses:   Acute gastritis, presence of bleeding unspecified, unspecified gastritis type   Epigastric pain            Max Cortez, DO  12/24/20 0129

## 2020-12-24 NOTE — DISCHARGE INSTRUCTIONS
Please return with new or worsening symptoms. Use medication provided to help with symptoms and follow closely with primary care for further evaluation.

## 2020-12-24 NOTE — ED TRIAGE NOTES
Pt c/o abd. Pain was diagnosed with pancreatitis from [facility].  And since then has had more abd. Pain. Saw pcp today and was told to come in.

## 2020-12-29 DIAGNOSIS — G25.81 RESTLESS LEG SYNDROME: ICD-10-CM

## 2020-12-30 ENCOUNTER — TELEMEDICINE (OUTPATIENT)
Dept: ENDOCRINOLOGY | Facility: CLINIC | Age: 35
End: 2020-12-30

## 2020-12-30 DIAGNOSIS — E11.65 TYPE 2 DIABETES MELLITUS WITH HYPERGLYCEMIA, WITH LONG-TERM CURRENT USE OF INSULIN (HCC): Primary | ICD-10-CM

## 2020-12-30 DIAGNOSIS — E11.42 POLYNEUROPATHY DUE TO TYPE 2 DIABETES MELLITUS (HCC): ICD-10-CM

## 2020-12-30 DIAGNOSIS — I15.2 HYPERTENSION ASSOCIATED WITH DIABETES (HCC): ICD-10-CM

## 2020-12-30 DIAGNOSIS — E11.69 MIXED DIABETIC HYPERLIPIDEMIA ASSOCIATED WITH TYPE 2 DIABETES MELLITUS (HCC): ICD-10-CM

## 2020-12-30 DIAGNOSIS — E78.2 MIXED DIABETIC HYPERLIPIDEMIA ASSOCIATED WITH TYPE 2 DIABETES MELLITUS (HCC): ICD-10-CM

## 2020-12-30 DIAGNOSIS — Z79.4 TYPE 2 DIABETES MELLITUS WITH HYPERGLYCEMIA, WITH LONG-TERM CURRENT USE OF INSULIN (HCC): Primary | ICD-10-CM

## 2020-12-30 DIAGNOSIS — E11.59 HYPERTENSION ASSOCIATED WITH DIABETES (HCC): ICD-10-CM

## 2020-12-30 PROCEDURE — 99214 OFFICE O/P EST MOD 30 MIN: CPT | Performed by: INTERNAL MEDICINE

## 2020-12-30 RX ORDER — ROPINIROLE 0.5 MG/1
TABLET, FILM COATED ORAL
Qty: 60 TABLET | Refills: 3 | Status: SHIPPED | OUTPATIENT
Start: 2020-12-30

## 2020-12-30 RX ORDER — BLOOD-GLUCOSE METER
KIT MISCELLANEOUS
Qty: 120 EACH | Refills: 11 | Status: SHIPPED | OUTPATIENT
Start: 2020-12-30

## 2020-12-30 RX ORDER — LANCING DEVICE
EACH MISCELLANEOUS
Qty: 1 EACH | Refills: 11 | Status: SHIPPED | OUTPATIENT
Start: 2020-12-30

## 2020-12-30 RX ORDER — GLUCOSAMINE HCL/CHONDROITIN SU 500-400 MG
CAPSULE ORAL
Qty: 120 EACH | Refills: 11 | Status: SHIPPED | OUTPATIENT
Start: 2020-12-30

## 2020-12-30 RX ORDER — GABAPENTIN 600 MG/1
TABLET ORAL
Qty: 150 TABLET | Refills: 11 | Status: SHIPPED | OUTPATIENT
Start: 2020-12-30

## 2020-12-30 RX ORDER — IBUPROFEN 600 MG/1
1 TABLET ORAL ONCE AS NEEDED
Qty: 1 EACH | Refills: 11 | Status: SHIPPED | OUTPATIENT
Start: 2020-12-30

## 2021-01-05 ENCOUNTER — TELEPHONE (OUTPATIENT)
Dept: ENDOCRINOLOGY | Facility: CLINIC | Age: 36
End: 2021-01-05

## 2021-01-14 ENCOUNTER — TELEPHONE (OUTPATIENT)
Dept: FAMILY MEDICINE CLINIC | Facility: CLINIC | Age: 36
End: 2021-01-14

## 2021-01-14 NOTE — TELEPHONE ENCOUNTER
Left message that we had to change his appointment time to 10:00 am on Monday. He needs to be here at least fifteen minuets early/hub to read

## 2021-01-26 ENCOUNTER — TELEPHONE (OUTPATIENT)
Dept: FAMILY MEDICINE CLINIC | Facility: CLINIC | Age: 36
End: 2021-01-26

## 2021-01-26 NOTE — TELEPHONE ENCOUNTER
Left message to remind of appointment needs to be here at least fifteen minuets early./hub to read

## 2021-02-09 RX ORDER — DEXAMETHASONE 4 MG/1
TABLET ORAL
COMMUNITY
Start: 2021-02-02

## 2021-02-09 RX ORDER — ASPIRIN 81 MG/1
TABLET, COATED ORAL
COMMUNITY
Start: 2021-02-02

## 2021-02-09 RX ORDER — ATORVASTATIN CALCIUM 40 MG/1
TABLET, FILM COATED ORAL
COMMUNITY
Start: 2021-02-02

## 2021-02-10 DIAGNOSIS — R05.9 COUGH: Primary | ICD-10-CM

## 2021-02-10 RX ORDER — ALBUTEROL SULFATE 90 UG/1
2 AEROSOL, METERED RESPIRATORY (INHALATION) EVERY 4 HOURS PRN
Qty: 18 G | Refills: 0 | Status: SHIPPED | OUTPATIENT
Start: 2021-02-10

## 2021-02-10 RX ORDER — BENZONATATE 100 MG/1
100 CAPSULE ORAL 3 TIMES DAILY PRN
Qty: 45 CAPSULE | Refills: 0 | Status: SHIPPED | OUTPATIENT
Start: 2021-02-10

## 2021-02-10 NOTE — PROGRESS NOTES
Pt sent message stating he tested positive for COVID at [facility] recently and is requesting something for his cough. States he cannot stop coughing and it hurts to cough.

## 2021-02-24 ENCOUNTER — TELEPHONE (OUTPATIENT)
Dept: FAMILY MEDICINE CLINIC | Facility: CLINIC | Age: 36
End: 2021-02-24

## 2021-02-24 NOTE — TELEPHONE ENCOUNTER
Left a message to remind of appointment needs to be here at least fifteen minuets early and wear a mask.

## 2021-04-28 ENCOUNTER — TELEPHONE (OUTPATIENT)
Dept: ENDOCRINOLOGY | Facility: CLINIC | Age: 36
End: 2021-04-28

## 2021-05-26 ENCOUNTER — DOCUMENTATION (OUTPATIENT)
Dept: ENDOCRINOLOGY | Facility: CLINIC | Age: 36
End: 2021-05-26

## 2021-08-23 ENCOUNTER — TELEPHONE (OUTPATIENT)
Dept: ENDOCRINOLOGY | Facility: CLINIC | Age: 36
End: 2021-08-23

## 2021-08-30 DIAGNOSIS — Z79.4 TYPE 2 DIABETES MELLITUS WITH HYPERGLYCEMIA, WITH LONG-TERM CURRENT USE OF INSULIN (HCC): Primary | ICD-10-CM

## 2021-08-30 DIAGNOSIS — E11.65 TYPE 2 DIABETES MELLITUS WITH HYPERGLYCEMIA, WITH LONG-TERM CURRENT USE OF INSULIN (HCC): Primary | ICD-10-CM

## 2021-08-30 DIAGNOSIS — E11.69 MIXED DIABETIC HYPERLIPIDEMIA ASSOCIATED WITH TYPE 2 DIABETES MELLITUS (HCC): ICD-10-CM

## 2021-08-30 DIAGNOSIS — E78.2 MIXED DIABETIC HYPERLIPIDEMIA ASSOCIATED WITH TYPE 2 DIABETES MELLITUS (HCC): ICD-10-CM

## 2021-09-15 DIAGNOSIS — G47.00 INSOMNIA, UNSPECIFIED TYPE: ICD-10-CM

## 2021-09-15 RX ORDER — TRAZODONE HYDROCHLORIDE 50 MG/1
TABLET ORAL
Qty: 30 TABLET | Refills: 5 | OUTPATIENT
Start: 2021-09-15